# Patient Record
Sex: FEMALE | Race: WHITE | Employment: UNEMPLOYED | URBAN - METROPOLITAN AREA
[De-identification: names, ages, dates, MRNs, and addresses within clinical notes are randomized per-mention and may not be internally consistent; named-entity substitution may affect disease eponyms.]

---

## 2023-12-27 ENCOUNTER — APPOINTMENT (OUTPATIENT)
Dept: GENERAL RADIOLOGY | Age: 78
End: 2023-12-27
Payer: MEDICARE

## 2023-12-27 ENCOUNTER — HOSPITAL ENCOUNTER (EMERGENCY)
Age: 78
Discharge: HOME | End: 2023-12-27
Payer: MEDICARE

## 2023-12-27 VITALS
RESPIRATION RATE: 16 BRPM | OXYGEN SATURATION: 98 % | DIASTOLIC BLOOD PRESSURE: 69 MMHG | TEMPERATURE: 97.9 F | HEART RATE: 82 BPM | BODY MASS INDEX: 27.32 KG/M2 | WEIGHT: 170 LBS | HEIGHT: 66 IN | SYSTOLIC BLOOD PRESSURE: 164 MMHG
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
## 2023-12-27 DIAGNOSIS — M25.462 EFFUSION OF LEFT KNEE: ICD-10-CM

## 2023-12-27 DIAGNOSIS — S82.842A CLOSED BIMALLEOLAR FRACTURE OF LEFT ANKLE, INITIAL ENCOUNTER: Primary | ICD-10-CM

## 2023-12-27 PROCEDURE — 73610 X-RAY EXAM OF ANKLE: CPT

## 2023-12-27 PROCEDURE — 73590 X-RAY EXAM OF LOWER LEG: CPT

## 2023-12-27 PROCEDURE — 29515 APPLICATION SHORT LEG SPLINT: CPT

## 2023-12-27 PROCEDURE — 73630 X-RAY EXAM OF FOOT: CPT

## 2023-12-27 PROCEDURE — 73560 X-RAY EXAM OF KNEE 1 OR 2: CPT

## 2023-12-27 PROCEDURE — 99283 EMERGENCY DEPT VISIT LOW MDM: CPT

## 2023-12-27 PROCEDURE — 6370000000 HC RX 637 (ALT 250 FOR IP)

## 2023-12-27 RX ORDER — ACETAMINOPHEN 500 MG
1000 TABLET ORAL ONCE
Status: COMPLETED | OUTPATIENT
Start: 2023-12-27 | End: 2023-12-27

## 2023-12-27 RX ORDER — HYDROCODONE BITARTRATE AND ACETAMINOPHEN 5; 325 MG/1; MG/1
1 TABLET ORAL EVERY 8 HOURS PRN
Qty: 9 TABLET | Refills: 0 | Status: SHIPPED | OUTPATIENT
Start: 2023-12-27 | End: 2023-12-30

## 2023-12-27 RX ORDER — METHYLPREDNISOLONE 4 MG/1
TABLET ORAL
Qty: 1 KIT | Refills: 0 | Status: SHIPPED | OUTPATIENT
Start: 2023-12-27 | End: 2024-01-02

## 2023-12-27 RX ADMIN — ACETAMINOPHEN 1000 MG: 500 TABLET ORAL at 12:17

## 2023-12-27 NOTE — ED NOTES
Pt Dc instructions provided. Crutches given to patient. E scripts sent to [facility]. Patient aware to not bear any weight on foot and to follow up with ortho tomorrow.  Electronically signed by Alexei Moody RN on 12/27/2023 at 3:20 PM

## 2023-12-27 NOTE — DISCHARGE INSTRUCTIONS
Take medications as directed. RICE. Utilize crutches and do not bear any weight on your left foot/ankle. Follow-up with orthopedics tomorrow at [facility] office. [address], [address] [facility]  [phone]    Return to ED if any new, or worsening symptoms.

## 2023-12-27 NOTE — ED PROVIDER NOTES
osteoarthritis of the first MTP joint. Moderate plantar calcaneal spur. X-ray L ankle per radiologist interpretation demonstrates acute, mildly displaced bimalleolar fracture without talar shift. Prominent anterior and moderate medial and lateral soft tissue swelling. Calcaneal spurs. Posterior, stirrup splint applied to LLE. Patient tolerated splint application well. MSPs intact pre and post splint application. Call placed to Ortho. Spoke with Dr. Adina Rogers whom is agreeable to patient is stable for outpatient follow-up, management and states that he will see patient in [facility] office tomorrow morning. Patient provided with crutches and crutch instruction. Patient educated on supportive care, RICE, nonweightbearing status. Patient given prescription for Norco, Medrol. Ace bandage applied to L knee. Patient given standard anticipatory guidance, return to ED warning signs, strict follow-up guidelines with PCP, Ortho. Patient verbalized understanding of education, instruction. Patient is agreeable to plan. Patient discharged home in stable condition. Problems Addressed:  Closed bimalleolar fracture of left ankle, initial encounter: acute illness or injury    Amount and/or Complexity of Data Reviewed  Radiology: ordered. Risk  OTC drugs. Prescription drug management. REASSESSMENT      CRITICAL CARE TIME   Total Critical Care time was 0 minutes, excluding separately reportable procedures. There was a high probability of clinically significant/life threatening deterioration in the patient's condition which required my urgent intervention. CONSULTS:  None    PROCEDURES:  Unless otherwise noted below, none     Procedures    No LOS Charge filed     FINAL IMPRESSION      1. Closed bimalleolar fracture of left ankle, initial encounter    2. Fall, initial encounter    3.  Effusion of left knee          DISPOSITION/PLAN   DISPOSITION Discharge - Pending Orders Complete 12/27/2023 02:54:46

## 2023-12-28 ENCOUNTER — TELEPHONE (OUTPATIENT)
Dept: ORTHOPEDIC SURGERY | Facility: CLINIC | Age: 78
End: 2023-12-28

## 2023-12-28 ENCOUNTER — OFFICE VISIT (OUTPATIENT)
Dept: ORTHOPEDIC SURGERY | Facility: CLINIC | Age: 78
End: 2023-12-28
Payer: COMMERCIAL

## 2023-12-28 DIAGNOSIS — S82.852A CLOSED TRIMALLEOLAR FRACTURE OF LEFT ANKLE, INITIAL ENCOUNTER: ICD-10-CM

## 2023-12-28 DIAGNOSIS — M25.572 ACUTE LEFT ANKLE PAIN: ICD-10-CM

## 2023-12-28 PROCEDURE — 99213 OFFICE O/P EST LOW 20 MIN: CPT | Mod: 57 | Performed by: ORTHOPAEDIC SURGERY

## 2023-12-28 PROCEDURE — 99203 OFFICE O/P NEW LOW 30 MIN: CPT | Performed by: ORTHOPAEDIC SURGERY

## 2023-12-28 NOTE — PROGRESS NOTES
History of Present Illness  Patient presents with pain and swelling in the left ankle after a twisting injury.  The patient had difficulty bearing weight after the event.  The patient denies any antecedent pain or any additional injuries.  The pain is sharp in nature, severe, worse with movement and better with rest.     The patient was seen in the emergency department, splinted and referred for follow-up.     Review of Systems   GENERAL: Negative for malaise, significant weight loss, fever  MUSCULOSKELETAL: see HPI  NEURO:  Negative     Exam  Left ankle:  Skin healthy and intact  Swelling and ecchymosis:  Moderate  Tenderness:  Lateral malleolus / lateral ligaments: positive   Medial malleolous:/ deltoid:  positive     No tenderness  Achilles, Lisfranc joint, calcaneus  Pain with squeeze test:  Mild  Neurovascular exam normal distally  2+ dorsalis pedis pulse and good cap refill     Radiographs   Radiographs demonstrate fracture of the a trimalleolar ankle fracture     Assessment  Patient with a left ankle unstable fracture      Plan  We discussed the unstable nature of the patient´s injury.  We discussed the role for surgical intervention and the necessity for appropriate alignment and stabilization.  The risks were discussed for both surgical and non-surgical treatment (including infection, DVT/PE, stiffness, and arthrosis in the future).  Discussed possible hardware removal / irritation.     Encouraged ice, elevation in interim.   Strict non weight bearing and splint.     Will proceed with surgical risk assessment / scheduling.          REVIEW OF SYSTEMS  General: Negative for malaise, significant weight loss, fever  Musculoskeletal: See HPI  Neuro:  Negative for numbness/tingling      Medication Documentation Review Audit    **Prior to Admission medications have not yet been reviewed**         PHYSICAL EXAM  General Appearance/Mental Status: A&Ox3, no apparent distress  HEENT: Normal  Lungs: Clear  Heart:  RRR  Abdomen: Soft, nontender  Extremities: Abnormal closed left ankle trimalleolar fracture

## 2024-01-04 ENCOUNTER — HOSPITAL ENCOUNTER (OUTPATIENT)
Dept: PREADMISSION TESTING | Age: 79
Discharge: HOME | End: 2024-01-08
Payer: MEDICARE

## 2024-01-04 VITALS
HEIGHT: 65 IN | RESPIRATION RATE: 22 BRPM | SYSTOLIC BLOOD PRESSURE: 126 MMHG | TEMPERATURE: 98 F | HEART RATE: 83 BPM | DIASTOLIC BLOOD PRESSURE: 58 MMHG | WEIGHT: 169.4 LBS | OXYGEN SATURATION: 95 % | BODY MASS INDEX: 28.22 KG/M2

## 2024-01-04 LAB
ALBUMIN SERPL-MCNC: 4 G/DL (ref 3.5–4.6)
ALP SERPL-CCNC: 84 U/L (ref 40–130)
ALT SERPL-CCNC: 24 U/L (ref 0–33)
ANION GAP SERPL CALCULATED.3IONS-SCNC: 12 MEQ/L (ref 9–15)
APTT PPP: 46.8 SEC (ref 24.4–36.8)
AST SERPL-CCNC: 18 U/L (ref 0–35)
BASOPHILS # BLD: 0 K/UL (ref 0–0.2)
BASOPHILS NFR BLD: 0.4 %
BILIRUB SERPL-MCNC: 0.7 MG/DL (ref 0.2–0.7)
BUN SERPL-MCNC: 13 MG/DL (ref 8–23)
CALCIUM SERPL-MCNC: 9.5 MG/DL (ref 8.5–9.9)
CHLORIDE SERPL-SCNC: 100 MEQ/L (ref 95–107)
CO2 SERPL-SCNC: 27 MEQ/L (ref 20–31)
CREAT SERPL-MCNC: 0.73 MG/DL (ref 0.5–0.9)
EOSINOPHIL # BLD: 0.2 K/UL (ref 0–0.7)
EOSINOPHIL NFR BLD: 1.6 %
ERYTHROCYTE [DISTWIDTH] IN BLOOD BY AUTOMATED COUNT: 12.7 % (ref 11.5–14.5)
GLOBULIN SER CALC-MCNC: 2.4 G/DL (ref 2.3–3.5)
GLUCOSE SERPL-MCNC: 127 MG/DL (ref 70–99)
HCT VFR BLD AUTO: 39.6 % (ref 37–47)
HGB BLD-MCNC: 12.7 G/DL (ref 12–16)
INR PPP: 1
LYMPHOCYTES # BLD: 1.2 K/UL (ref 1–4.8)
LYMPHOCYTES NFR BLD: 13 %
MCH RBC QN AUTO: 31.2 PG (ref 27–31.3)
MCHC RBC AUTO-ENTMCNC: 32.1 % (ref 33–37)
MCV RBC AUTO: 97.3 FL (ref 79.4–94.8)
MONOCYTES # BLD: 0.6 K/UL (ref 0.2–0.8)
MONOCYTES NFR BLD: 6.6 %
NEUTROPHILS # BLD: 7.1 K/UL (ref 1.4–6.5)
NEUTS SEG NFR BLD: 76.9 %
PLATELET # BLD AUTO: 240 K/UL (ref 130–400)
POTASSIUM SERPL-SCNC: 3.9 MEQ/L (ref 3.4–4.9)
PROT SERPL-MCNC: 6.4 G/DL (ref 6.3–8)
PROTHROMBIN TIME: 13.4 SEC (ref 12.3–14.9)
RBC # BLD AUTO: 4.07 M/UL (ref 4.2–5.4)
SODIUM SERPL-SCNC: 139 MEQ/L (ref 135–144)
WBC # BLD AUTO: 9.2 K/UL (ref 4.8–10.8)

## 2024-01-04 PROCEDURE — 85730 THROMBOPLASTIN TIME PARTIAL: CPT

## 2024-01-04 PROCEDURE — 85025 COMPLETE CBC W/AUTO DIFF WBC: CPT

## 2024-01-04 PROCEDURE — 80053 COMPREHEN METABOLIC PANEL: CPT

## 2024-01-04 PROCEDURE — 85610 PROTHROMBIN TIME: CPT

## 2024-01-04 PROCEDURE — 93005 ELECTROCARDIOGRAM TRACING: CPT | Performed by: ORTHOPAEDIC SURGERY

## 2024-01-04 RX ORDER — LISINOPRIL 30 MG/1
30 TABLET ORAL DAILY
Status: ON HOLD | COMMUNITY

## 2024-01-04 RX ORDER — ANASTROZOLE 1 MG/1
1 TABLET ORAL DAILY
Status: ON HOLD | COMMUNITY

## 2024-01-04 RX ORDER — HYDROCODONE BITARTRATE AND ACETAMINOPHEN 5; 325 MG/1; MG/1
1 TABLET ORAL EVERY 6 HOURS PRN
Status: ON HOLD | COMMUNITY

## 2024-01-04 RX ORDER — ATORVASTATIN CALCIUM 40 MG/1
40 TABLET, FILM COATED ORAL DAILY
Status: ON HOLD | COMMUNITY

## 2024-01-04 RX ORDER — ALBUTEROL SULFATE 90 UG/1
2 AEROSOL, METERED RESPIRATORY (INHALATION) EVERY 4 HOURS PRN
Status: ON HOLD | COMMUNITY
Start: 2020-12-01

## 2024-01-04 NOTE — PROGRESS NOTES
PAT instruction sheet reviewed with patient, who verbalized understanding. Patient C/O cough x 1 week, denies fever, chills,sore throat, body aches, ect. Lungs clear bilateral.

## 2024-01-05 LAB
EKG ATRIAL RATE: 84 BPM
EKG P AXIS: 47 DEGREES
EKG P-R INTERVAL: 140 MS
EKG Q-T INTERVAL: 372 MS
EKG QRS DURATION: 80 MS
EKG QTC CALCULATION (BAZETT): 439 MS
EKG R AXIS: 34 DEGREES
EKG T AXIS: 53 DEGREES
EKG VENTRICULAR RATE: 84 BPM

## 2024-01-08 ENCOUNTER — HOSPITAL ENCOUNTER (OUTPATIENT)
Age: 79
Setting detail: OUTPATIENT SURGERY
Discharge: HOME | End: 2024-01-08
Attending: ORTHOPAEDIC SURGERY | Admitting: ORTHOPAEDIC SURGERY
Payer: MEDICARE

## 2024-01-08 ENCOUNTER — APPOINTMENT (OUTPATIENT)
Dept: GENERAL RADIOLOGY | Age: 79
End: 2024-01-08
Attending: ORTHOPAEDIC SURGERY
Payer: MEDICARE

## 2024-01-08 ENCOUNTER — ANESTHESIA (OUTPATIENT)
Dept: OPERATING ROOM | Age: 79
End: 2024-01-08
Payer: MEDICARE

## 2024-01-08 ENCOUNTER — ANESTHESIA EVENT (OUTPATIENT)
Dept: OPERATING ROOM | Age: 79
End: 2024-01-08
Payer: MEDICARE

## 2024-01-08 VITALS
DIASTOLIC BLOOD PRESSURE: 65 MMHG | BODY MASS INDEX: 28.22 KG/M2 | WEIGHT: 169.4 LBS | RESPIRATION RATE: 16 BRPM | SYSTOLIC BLOOD PRESSURE: 144 MMHG | HEIGHT: 65 IN | OXYGEN SATURATION: 98 % | TEMPERATURE: 98.9 F | HEART RATE: 67 BPM

## 2024-01-08 DIAGNOSIS — Z87.81 S/P ORIF (OPEN REDUCTION INTERNAL FIXATION) FRACTURE: Primary | ICD-10-CM

## 2024-01-08 DIAGNOSIS — R52 PAIN: ICD-10-CM

## 2024-01-08 DIAGNOSIS — Z98.890 S/P ORIF (OPEN REDUCTION INTERNAL FIXATION) FRACTURE: Primary | ICD-10-CM

## 2024-01-08 PROCEDURE — 27822 TREATMENT OF ANKLE FRACTURE: CPT | Performed by: PHYSICIAN ASSISTANT

## 2024-01-08 PROCEDURE — 2500000003 HC RX 250 WO HCPCS: Performed by: STUDENT IN AN ORGANIZED HEALTH CARE EDUCATION/TRAINING PROGRAM

## 2024-01-08 PROCEDURE — 64447 NJX AA&/STRD FEMORAL NRV IMG: CPT | Performed by: STUDENT IN AN ORGANIZED HEALTH CARE EDUCATION/TRAINING PROGRAM

## 2024-01-08 PROCEDURE — 7100000011 HC PHASE II RECOVERY - ADDTL 15 MIN: Performed by: ORTHOPAEDIC SURGERY

## 2024-01-08 PROCEDURE — 2580000003 HC RX 258: Performed by: STUDENT IN AN ORGANIZED HEALTH CARE EDUCATION/TRAINING PROGRAM

## 2024-01-08 PROCEDURE — 2580000003 HC RX 258: Performed by: ORTHOPAEDIC SURGERY

## 2024-01-08 PROCEDURE — 3700000000 HC ANESTHESIA ATTENDED CARE: Performed by: ORTHOPAEDIC SURGERY

## 2024-01-08 PROCEDURE — 7100000001 HC PACU RECOVERY - ADDTL 15 MIN: Performed by: ORTHOPAEDIC SURGERY

## 2024-01-08 PROCEDURE — 3600000014 HC SURGERY LEVEL 4 ADDTL 15MIN: Performed by: ORTHOPAEDIC SURGERY

## 2024-01-08 PROCEDURE — 6360000002 HC RX W HCPCS: Performed by: STUDENT IN AN ORGANIZED HEALTH CARE EDUCATION/TRAINING PROGRAM

## 2024-01-08 PROCEDURE — 7100000000 HC PACU RECOVERY - FIRST 15 MIN: Performed by: ORTHOPAEDIC SURGERY

## 2024-01-08 PROCEDURE — 64445 NJX AA&/STRD SCIATIC NRV IMG: CPT | Performed by: STUDENT IN AN ORGANIZED HEALTH CARE EDUCATION/TRAINING PROGRAM

## 2024-01-08 PROCEDURE — 7100000010 HC PHASE II RECOVERY - FIRST 15 MIN: Performed by: ORTHOPAEDIC SURGERY

## 2024-01-08 PROCEDURE — 6360000002 HC RX W HCPCS: Performed by: ORTHOPAEDIC SURGERY

## 2024-01-08 PROCEDURE — 3600000004 HC SURGERY LEVEL 4 BASE: Performed by: ORTHOPAEDIC SURGERY

## 2024-01-08 PROCEDURE — 2709999900 HC NON-CHARGEABLE SUPPLY: Performed by: ORTHOPAEDIC SURGERY

## 2024-01-08 PROCEDURE — 3700000001 HC ADD 15 MINUTES (ANESTHESIA): Performed by: ORTHOPAEDIC SURGERY

## 2024-01-08 PROCEDURE — 27822 TREATMENT OF ANKLE FRACTURE: CPT | Performed by: ORTHOPAEDIC SURGERY

## 2024-01-08 PROCEDURE — C1713 ANCHOR/SCREW BN/BN,TIS/BN: HCPCS | Performed by: ORTHOPAEDIC SURGERY

## 2024-01-08 DEVICE — SCREW BNE L12MM DIA3.5MM CORT S STL ST NONCANNULATED LOK: Type: IMPLANTABLE DEVICE | Site: ANKLE | Status: FUNCTIONAL

## 2024-01-08 DEVICE — SCREW BNE L14MM DIA3.5MM CORT S STL ST NONCANNULATED LOK: Type: IMPLANTABLE DEVICE | Site: ANKLE | Status: FUNCTIONAL

## 2024-01-08 DEVICE — SCREW BNE L16MM DIA4MM CANC S STL ST CANN NONLOCKING FULL: Type: IMPLANTABLE DEVICE | Site: ANKLE | Status: FUNCTIONAL

## 2024-01-08 DEVICE — PLATE BNE L93MM 8 H S STL 1/3 TBLR LOK COMPR W/ CLLR FOR: Type: IMPLANTABLE DEVICE | Site: ANKLE | Status: FUNCTIONAL

## 2024-01-08 DEVICE — SCREW BNE L18MM DIA4MM CANC S STL ST CANN NONLOCKING FULL: Type: IMPLANTABLE DEVICE | Site: ANKLE | Status: FUNCTIONAL

## 2024-01-08 DEVICE — SCREW BNE L48MM DIA4MM S STL CANN LNG HALF THRD SM HEX SOCK: Type: IMPLANTABLE DEVICE | Site: ANKLE | Status: FUNCTIONAL

## 2024-01-08 DEVICE — ISOLA SPINE SYSTEM ROD BENDERS (TUBULAR) SET
Type: IMPLANTABLE DEVICE | Site: ANKLE | Status: FUNCTIONAL
Brand: ISOLA

## 2024-01-08 DEVICE — WASHER ORTH DIA7MM FOR CANN SCR: Type: IMPLANTABLE DEVICE | Site: ANKLE | Status: FUNCTIONAL

## 2024-01-08 RX ORDER — MIDAZOLAM HYDROCHLORIDE 1 MG/ML
INJECTION INTRAMUSCULAR; INTRAVENOUS PRN
Status: DISCONTINUED | OUTPATIENT
Start: 2024-01-08 | End: 2024-01-08 | Stop reason: SDUPTHER

## 2024-01-08 RX ORDER — ONDANSETRON 2 MG/ML
4 INJECTION INTRAMUSCULAR; INTRAVENOUS
Status: DISCONTINUED | OUTPATIENT
Start: 2024-01-08 | End: 2024-01-08 | Stop reason: HOSPADM

## 2024-01-08 RX ORDER — OXYCODONE HYDROCHLORIDE 5 MG/1
5 TABLET ORAL EVERY 6 HOURS PRN
Qty: 28 TABLET | Refills: 0 | Status: ON HOLD | OUTPATIENT
Start: 2024-01-08 | End: 2024-01-15

## 2024-01-08 RX ORDER — DIPHENHYDRAMINE HYDROCHLORIDE 50 MG/ML
12.5 INJECTION INTRAMUSCULAR; INTRAVENOUS
Status: DISCONTINUED | OUTPATIENT
Start: 2024-01-08 | End: 2024-01-08 | Stop reason: HOSPADM

## 2024-01-08 RX ORDER — MEPERIDINE HYDROCHLORIDE 25 MG/ML
12.5 INJECTION INTRAMUSCULAR; INTRAVENOUS; SUBCUTANEOUS EVERY 5 MIN PRN
Status: DISCONTINUED | OUTPATIENT
Start: 2024-01-08 | End: 2024-01-08 | Stop reason: HOSPADM

## 2024-01-08 RX ORDER — MAGNESIUM HYDROXIDE 1200 MG/15ML
LIQUID ORAL CONTINUOUS PRN
Status: COMPLETED | OUTPATIENT
Start: 2024-01-08 | End: 2024-01-08

## 2024-01-08 RX ORDER — ROPIVACAINE HYDROCHLORIDE 5 MG/ML
INJECTION, SOLUTION EPIDURAL; INFILTRATION; PERINEURAL
Status: COMPLETED | OUTPATIENT
Start: 2024-01-08 | End: 2024-01-08

## 2024-01-08 RX ORDER — LIDOCAINE HYDROCHLORIDE 10 MG/ML
INJECTION, SOLUTION INFILTRATION; PERINEURAL
Status: COMPLETED | OUTPATIENT
Start: 2024-01-08 | End: 2024-01-08

## 2024-01-08 RX ORDER — LABETALOL HYDROCHLORIDE 5 MG/ML
10 INJECTION, SOLUTION INTRAVENOUS
Status: DISCONTINUED | OUTPATIENT
Start: 2024-01-08 | End: 2024-01-08 | Stop reason: HOSPADM

## 2024-01-08 RX ORDER — FENTANYL CITRATE 0.05 MG/ML
50 INJECTION, SOLUTION INTRAMUSCULAR; INTRAVENOUS EVERY 5 MIN PRN
Status: DISCONTINUED | OUTPATIENT
Start: 2024-01-08 | End: 2024-01-08 | Stop reason: HOSPADM

## 2024-01-08 RX ORDER — SODIUM CHLORIDE, SODIUM LACTATE, POTASSIUM CHLORIDE, CALCIUM CHLORIDE 600; 310; 30; 20 MG/100ML; MG/100ML; MG/100ML; MG/100ML
INJECTION, SOLUTION INTRAVENOUS CONTINUOUS
Status: DISCONTINUED | OUTPATIENT
Start: 2024-01-08 | End: 2024-01-08 | Stop reason: HOSPADM

## 2024-01-08 RX ORDER — PROPOFOL 10 MG/ML
INJECTION, EMULSION INTRAVENOUS PRN
Status: DISCONTINUED | OUTPATIENT
Start: 2024-01-08 | End: 2024-01-08 | Stop reason: SDUPTHER

## 2024-01-08 RX ORDER — SODIUM CHLORIDE 0.9 % (FLUSH) 0.9 %
5-40 SYRINGE (ML) INJECTION PRN
Status: DISCONTINUED | OUTPATIENT
Start: 2024-01-08 | End: 2024-01-08 | Stop reason: HOSPADM

## 2024-01-08 RX ORDER — HYDRALAZINE HYDROCHLORIDE 20 MG/ML
10 INJECTION INTRAMUSCULAR; INTRAVENOUS
Status: DISCONTINUED | OUTPATIENT
Start: 2024-01-08 | End: 2024-01-08 | Stop reason: HOSPADM

## 2024-01-08 RX ORDER — PROCHLORPERAZINE EDISYLATE 5 MG/ML
5 INJECTION INTRAMUSCULAR; INTRAVENOUS
Status: DISCONTINUED | OUTPATIENT
Start: 2024-01-08 | End: 2024-01-08 | Stop reason: HOSPADM

## 2024-01-08 RX ORDER — OXYCODONE HYDROCHLORIDE 5 MG/1
10 TABLET ORAL PRN
Status: DISCONTINUED | OUTPATIENT
Start: 2024-01-08 | End: 2024-01-08 | Stop reason: HOSPADM

## 2024-01-08 RX ORDER — OXYCODONE HYDROCHLORIDE 5 MG/1
5 TABLET ORAL PRN
Status: DISCONTINUED | OUTPATIENT
Start: 2024-01-08 | End: 2024-01-08 | Stop reason: HOSPADM

## 2024-01-08 RX ORDER — FENTANYL CITRATE 0.05 MG/ML
25 INJECTION, SOLUTION INTRAMUSCULAR; INTRAVENOUS EVERY 5 MIN PRN
Status: DISCONTINUED | OUTPATIENT
Start: 2024-01-08 | End: 2024-01-08 | Stop reason: HOSPADM

## 2024-01-08 RX ORDER — SODIUM CHLORIDE 0.9 % (FLUSH) 0.9 %
5-40 SYRINGE (ML) INJECTION EVERY 12 HOURS SCHEDULED
Status: DISCONTINUED | OUTPATIENT
Start: 2024-01-08 | End: 2024-01-08 | Stop reason: HOSPADM

## 2024-01-08 RX ORDER — SODIUM CHLORIDE 9 MG/ML
INJECTION, SOLUTION INTRAVENOUS PRN
Status: DISCONTINUED | OUTPATIENT
Start: 2024-01-08 | End: 2024-01-08 | Stop reason: HOSPADM

## 2024-01-08 RX ADMIN — PROPOFOL 80 MCG/KG/MIN: 10 INJECTION, EMULSION INTRAVENOUS at 09:14

## 2024-01-08 RX ADMIN — SODIUM CHLORIDE, POTASSIUM CHLORIDE, SODIUM LACTATE AND CALCIUM CHLORIDE: 600; 310; 30; 20 INJECTION, SOLUTION INTRAVENOUS at 07:58

## 2024-01-08 RX ADMIN — MIDAZOLAM HYDROCHLORIDE 1 MG: 1 INJECTION, SOLUTION INTRAMUSCULAR; INTRAVENOUS at 09:25

## 2024-01-08 RX ADMIN — ROPIVACAINE HYDROCHLORIDE 25 ML: 5 INJECTION, SOLUTION EPIDURAL; INFILTRATION; PERINEURAL at 08:19

## 2024-01-08 RX ADMIN — MIDAZOLAM HYDROCHLORIDE 1 MG: 1 INJECTION, SOLUTION INTRAMUSCULAR; INTRAVENOUS at 08:05

## 2024-01-08 RX ADMIN — CEFAZOLIN 2000 MG: 2 INJECTION, POWDER, FOR SOLUTION INTRAMUSCULAR; INTRAVENOUS at 09:14

## 2024-01-08 RX ADMIN — ROPIVACAINE HYDROCHLORIDE 20 ML: 5 INJECTION, SOLUTION EPIDURAL; INFILTRATION; PERINEURAL at 08:11

## 2024-01-08 RX ADMIN — LIDOCAINE HYDROCHLORIDE 3 ML: 10 INJECTION, SOLUTION INFILTRATION; PERINEURAL at 08:14

## 2024-01-08 RX ADMIN — PROPOFOL 80 MG: 10 INJECTION, EMULSION INTRAVENOUS at 09:13

## 2024-01-08 RX ADMIN — LIDOCAINE HYDROCHLORIDE 2 ML: 10 INJECTION, SOLUTION INFILTRATION; PERINEURAL at 08:06

## 2024-01-08 ASSESSMENT — PAIN SCALES - GENERAL
PAINLEVEL_OUTOF10: 0

## 2024-01-08 ASSESSMENT — PAIN - FUNCTIONAL ASSESSMENT: PAIN_FUNCTIONAL_ASSESSMENT: 0-10

## 2024-01-08 NOTE — PROGRESS NOTES
Pt states \"left leg feels numb and pain free\" left toes pink and warm with brisk capillary refill noted.

## 2024-01-08 NOTE — OP NOTE
Operative Note      Patient: Dhara Wyman  YOB: 1945  MRN: [id number]    Date of Procedure: 1/8/2024    Pre-Op Diagnosis Codes:     * Closed trimalleolar fracture of left ankle, initial encounter [S82.852A]    Post-Op Diagnosis: Same       Procedure(s):  LEFT ANKLE OPEN REDUCTION INTERNAL FIXATION  OF BIMALLEOLAR ANKLE FRACTURE    Surgeon(s):  Kenneth Anderson MD    Assistant:   Physician Assistant: Justin Pitts PA-C    Anesthesia: General    Estimated Blood Loss (mL): Minimal    Complications: None    Specimens:   * No specimens in log *    Implants:  Implant Name Type Inv. Item Serial No.  Lot No. LRB No. Used Action   SCREW BNE L12MM DIA3.5MM MARGUERITE S STL ST NONCANNULATED AMANDA - [id number]  SCREW BNE L12MM DIA3.5MM MARGUERITE S STL ST NONCANNULATED AMANDA  DEPU[employer]  Left 3 Implanted   SCREW BNE L14MM DIA3.5MM MARGUERITE S STL ST NONCANNULATED AMANDA - [id number]  SCREW BNE L14MM DIA3.5MM MARGUERITE S STL ST NONCANNULATED AMANDA  DEPUY [employer]WD  Left 1 Implanted   SCREW BNE L16MM DIA4MM CANC S STL ST [place] NONLOCKING FULL - [id number]  SCREW BNE L16MM DIA4MM CANC S STL ST [place] NONLOCKING FULL  DEPUY [employer]-WD  Left 1 Implanted   SCREW BNE L18MM DIA4MM CANC S STL ST [place] NONLOCKING FULL - [id number]  SCREW BNE L18MM DIA4MM CANC S STL ST [place] NONLOCKING FULL  DEPUY SY[employer]ES USA-WD  Left 1 Implanted   SCREW BNE L20MM DIA4MM CANC S STL ST [place] NONLOCKING FULL - [id number]  SCREW BNE L20MM DIA4MM CANC S STL ST [place] NONLOCKING FULL  DEPUY SY[employer]ES [employer]-WD  Left 1 Implanted   PLATE BNE L93MM 8 H S STL 1/3 TBLR AMANDA COMPR W/ CLLR FOR - [id number]  PLATE BNE L93MM 8 H S STL 1/3 TBLR AMANDA COMPR W/ CLLR FOR  DEPUY SY[employer]-WD  Left 1 Implanted   SCREW BNE L48MM DIA4MM S STL AL[place] LNG HALF THRD SM HEX SOCK - [id number]  SCREW BNE L48MM DIA4MM S STL ALONA LNG HALF THRD SM HEX SOCK  DEPUY SYNTHES [employer]-WD  Left 2 Implanted   WASHER ORTH DIA7MM FOR ALONA SCR - [id number]  WASHER ORTH DIA7MM FOR ALONA SCR  DEPUY SYNTHES

## 2024-01-08 NOTE — DISCHARGE INSTRUCTIONS
pain. Keep in mind that this may occur in any order. Once a nerve block starts to wear off it is usually completely gone within 60 minutes.  Certain nerve blocks may cause other symptoms. If you have had a shoulder block or a block near your collar bone, you may have symptoms such as:  mild shortness of breath   a hoarse voice   blurry vision   unequal pupils   drooping of your face on the same side as the nerve block   Swelling at site of neck where block was placed   These are common side effects of this type of nerve block. These symptoms usually go away within 12-24 hours.      If you have severe or prolonged shortness of breath, please go to the nearest Emergency Room  If you continue to feel the effects of the nerve block for longer than 48 hours, please call [facility] [phone] and ask to speak with the Anesthetist on call.    Protection of a Numb Arm or Leg  After a nerve block, you cannot feel pain, pressure, or extremes in temperature in the effected limb. Because your arm or leg is numb it is at risk for injury. For example, it is possible to burn your numb arm or leg on a hot stove without knowing it. Here are some helpful tips to protect your arm or leg while it is numb:  While you are awake change position of your arm or leg often. This helps to avoid putting too much pressure on the limb for long periods of time.   While sleeping, pad the limb with pillows to avoid rolling onto it while you sleep. If you have had a shoulder or arm block, it is a good idea to sleep in a recliner with pillows under your arm to avoid rolling onto your numb arm as you sleep.   If you have a cast or tight dressing, check the color of your fingers/toes every couple of hours. Call your surgeon if any look discolored.   If you have had a shoulder, arm, or hand block, you may go home with a sling. The sling will help to keep your arm in a safe position. Wear the sling at all times until the nerve block completely wears

## 2024-01-08 NOTE — PROGRESS NOTES
Received from or into pacu on a cart accompanied in by MANDEEP Rose crna. Pt arousable and responsive, denies complaint. See assessment.

## 2024-01-08 NOTE — ANESTHESIA POSTPROCEDURE EVALUATION
Department of Anesthesiology  Postprocedure Note    Patient: Dhara Wyman  MRN: [id number]  YOB: 1945  Date of evaluation: 1/8/2024    Procedure Summary       Date: 01/08/24 Room / Location: [address]    Anesthesia Start: 0905 Anesthesia Stop: 1044    Procedure: LEFT ANKLE OPEN REDUCTION INTERNAL FIXATION  OF BIMALLEOLAR ANKLE FRACTURE (Left) Diagnosis:       Closed trimalleolar fracture of left ankle, initial encounter      (Closed trimalleolar fracture of left ankle, initial encounter [S82.852A])    Surgeons: Kenneth Anderson MD Responsible Provider: Jim Menendez MD    Anesthesia Type: MAC, regional ASA Status: 3            Anesthesia Type: No value filed.    Jodie Phase I:      Jodie Phase II:      Anesthesia Post Evaluation    Patient location during evaluation: bedside  Patient participation: complete - patient participated  Level of consciousness: awake and awake and alert  Airway patency: patent  Nausea & Vomiting: no nausea and no vomiting  Cardiovascular status: blood pressure returned to baseline and hemodynamically stable  Respiratory status: acceptable  Hydration status: euvolemic  Pain management: adequate        No notable events documented.

## 2024-01-08 NOTE — ANESTHESIA PROCEDURE NOTES
Peripheral Block    Patient location during procedure: pre-op  Reason for block: post-op pain management and at surgeon's request  Start time: 1/8/2024 8:06 AM  End time: 1/8/2024 8:11 AM  Staffing  Performed: anesthesiologist   Anesthesiologist: Jim Menendez MD  Performed by: Jim Menendez MD  Authorized by: Jim Menendez MD    Preanesthetic Checklist  Completed: patient identified, IV checked, site marked, risks and benefits discussed, surgical/procedural consents, equipment checked, pre-op evaluation, timeout performed, anesthesia consent given, oxygen available and monitors applied/VS acknowledged  Peripheral Block   Patient position: supine  Prep: ChloraPrep  Provider prep: mask and sterile gloves (Sterile probe cover)  Patient monitoring: cardiac monitor, continuous pulse ox, frequent blood pressure checks and IV access  Block type: Saphenous  Laterality: left  Injection technique: single-shot  Guidance: nerve stimulator and ultrasound guided  Local infiltration: ropivacaine  Infiltration strength: 0.5 %  Local infiltration: ropivacaine  Dose: 20 mL    Needle   Needle type: combined needle/nerve stimulator   Needle gauge: 22 G  Needle localization: anatomical landmarks and ultrasound guidance  Needle length: 10 cm  Assessment   Injection assessment: negative aspiration for heme, no paresthesia on injection and local visualized surrounding nerve on ultrasound  Paresthesia pain: immediately resolved  Slow fractionated injection: yes  Hemodynamics: stable  Real-time US image taken/store: yes  Outcomes: uncomplicated    Additional Notes  Ultrasound image printed and saved in patient chart.    Sterile probe cover used    Medications Administered  lidocaine injection 1% - Subcuticular   2 mL - 1/8/2024 8:06:00 AM  ropivacaine (NAROPIN) injection 0.5% - Perineural   20 mL - 1/8/2024 8:11:00 AM

## 2024-01-08 NOTE — ANESTHESIA PRE PROCEDURE
Allergies:    Allergies   Allergen Reactions    Codeine Other (See Comments)     Pain in abd, chest pain    Penicillins Hives       Problem List:  There is no problem list on file for this patient.      Past Medical History:        Diagnosis Date    Asthma     Breast cancer (HCC)     Hyperlipidemia     Hypertension        Past Surgical History:        Procedure Laterality Date    BREAST SURGERY      CHOLECYSTECTOMY      COLONOSCOPY      ENDOSCOPY, COLON, DIAGNOSTIC      MASTECTOMY Right     REFRACTIVE SURGERY Bilateral        Social History:    Social History     Tobacco Use    Smoking status: Former     Current packs/day: 0.00     Types: Cigarettes     Quit date: 1996     Years since quittin.0    Smokeless tobacco: Never   Substance Use Topics    Alcohol use: Yes     Alcohol/week: 2.0 standard drinks of alcohol     Types: 2 Cans of beer per week     Comment: daily                                Counseling given: Not Answered      Vital Signs (Current): There were no vitals filed for this visit.                                           BP Readings from Last 3 Encounters:   24 (!) 126/58   23 (!) 164/69       NPO Status:                                                                                 BMI:   Wt Readings from Last 3 Encounters:   24 76.8 kg (169 lb 6.4 oz)   23 77.1 kg (170 lb)     There is no height or weight on file to calculate BMI.    CBC:   Lab Results   Component Value Date/Time    WBC 9.2 2024 09:34 AM    RBC 4.07 2024 09:34 AM    HGB 12.7 2024 09:34 AM    HCT 39.6 2024 09:34 AM    MCV 97.3 2024 09:34 AM    RDW 12.7 2024 09:34 AM     2024 09:34 AM       CMP:   Lab Results   Component Value Date/Time     2024 09:34 AM    K 3.9 2024 09:34 AM     2024 09:34 AM    CO2 27 2024 09:34 AM    BUN 13 2024 09:34 AM    CREATININE 0.73 2024 09:34 AM    LABGLOM >60.0

## 2024-01-08 NOTE — ANESTHESIA PROCEDURE NOTES
Peripheral Block    Patient location during procedure: pre-op  Reason for block: post-op pain management and at surgeon's request  Start time: 1/8/2024 8:14 AM  End time: 1/8/2024 8:19 AM  Staffing  Performed: anesthesiologist   Anesthesiologist: Jim Menendez MD  Performed by: Jim Menendez MD  Authorized by: Jim Menendez MD    Preanesthetic Checklist  Completed: patient identified, IV checked, site marked, risks and benefits discussed, surgical/procedural consents, equipment checked, pre-op evaluation, timeout performed, anesthesia consent given, oxygen available and monitors applied/VS acknowledged  Peripheral Block   Patient position: supine  Prep: ChloraPrep  Provider prep: mask and sterile gloves (Sterile probe cover)  Patient monitoring: cardiac monitor, continuous pulse ox, frequent blood pressure checks and IV access  Block type: Sciatic  Popliteal  Laterality: left  Injection technique: single-shot  Guidance: nerve stimulator and ultrasound guided  Local infiltration: ropivacaine  Infiltration strength: 0.5 %  Local infiltration: ropivacaine  Dose: 25 mL    Needle   Needle type: combined needle/nerve stimulator   Needle gauge: 22 G  Needle localization: anatomical landmarks and ultrasound guidance  Needle length: 10 cm  Assessment   Injection assessment: negative aspiration for heme, no paresthesia on injection and local visualized surrounding nerve on ultrasound  Paresthesia pain: immediately resolved  Slow fractionated injection: yes  Hemodynamics: stable  Real-time US image taken/store: yes  Outcomes: uncomplicated    Additional Notes  Ultrasound image printed and saved in patient chart.    Sterile probe cover used    Medications Administered  lidocaine injection 1% - Subcuticular   3 mL - 1/8/2024 8:14:00 AM  ropivacaine (NAROPIN) injection 0.5% - Perineural   25 mL - 1/8/2024 8:19:00 AM

## 2024-01-08 NOTE — INTERVAL H&P NOTE
Update History & Physical    The patient's History and Physical of December 28, 2023 was reviewed with the patient and I examined the patient. There was no change. The surgical site was confirmed by the patient and me.     Plan: The risks, benefits, expected outcome, and alternative to the recommended procedure have been discussed with the patient. Patient understands and wants to proceed with the procedure.     Electronically signed by Kenneth Anderson MD on 1/8/2024 at 7:30 AM

## 2024-01-11 ENCOUNTER — HOSPITAL ENCOUNTER (INPATIENT)
Age: 79
LOS: 5 days | Discharge: HOME HEALTH | DRG: 641 | End: 2024-01-17
Attending: EMERGENCY MEDICINE | Admitting: INTERNAL MEDICINE
Payer: MEDICARE

## 2024-01-11 ENCOUNTER — APPOINTMENT (OUTPATIENT)
Dept: GENERAL RADIOLOGY | Age: 79
DRG: 641 | End: 2024-01-11
Payer: MEDICARE

## 2024-01-11 DIAGNOSIS — R42 LIGHTHEADED: ICD-10-CM

## 2024-01-11 DIAGNOSIS — R53.1 GENERAL WEAKNESS: Primary | ICD-10-CM

## 2024-01-11 DIAGNOSIS — E86.0 DEHYDRATION: ICD-10-CM

## 2024-01-11 DIAGNOSIS — R53.1 WEAKNESS: ICD-10-CM

## 2024-01-11 LAB
ALBUMIN SERPL-MCNC: 3.7 G/DL (ref 3.5–4.6)
ALP SERPL-CCNC: 137 U/L (ref 40–130)
ALT SERPL-CCNC: 54 U/L (ref 0–33)
ANION GAP SERPL CALCULATED.3IONS-SCNC: 19 MEQ/L (ref 9–15)
AST SERPL-CCNC: 32 U/L (ref 0–35)
BASOPHILS # BLD: 0.1 K/UL (ref 0–0.2)
BASOPHILS NFR BLD: 0.7 %
BILIRUB SERPL-MCNC: 0.7 MG/DL (ref 0.2–0.7)
BUN SERPL-MCNC: 17 MG/DL (ref 8–23)
CALCIUM SERPL-MCNC: 9.9 MG/DL (ref 8.5–9.9)
CHLORIDE SERPL-SCNC: 97 MEQ/L (ref 95–107)
CO2 SERPL-SCNC: 23 MEQ/L (ref 20–31)
CREAT SERPL-MCNC: 1.61 MG/DL (ref 0.5–0.9)
D DIMER PPP FEU-MCNC: >20 MG/L FEU (ref 0–0.5)
EOSINOPHIL # BLD: 0.1 K/UL (ref 0–0.7)
EOSINOPHIL NFR BLD: 0.4 %
ERYTHROCYTE [DISTWIDTH] IN BLOOD BY AUTOMATED COUNT: 12.6 % (ref 11.5–14.5)
GLOBULIN SER CALC-MCNC: 3.3 G/DL (ref 2.3–3.5)
GLUCOSE SERPL-MCNC: 218 MG/DL (ref 70–99)
HCT VFR BLD AUTO: 46.1 % (ref 37–47)
HGB BLD-MCNC: 14.6 G/DL (ref 12–16)
LACTATE BLDV-SCNC: 2.3 MMOL/L (ref 0.5–2.2)
LYMPHOCYTES # BLD: 1.5 K/UL (ref 1–4.8)
LYMPHOCYTES NFR BLD: 8.4 %
MCH RBC QN AUTO: 31.1 PG (ref 27–31.3)
MCHC RBC AUTO-ENTMCNC: 31.7 % (ref 33–37)
MCV RBC AUTO: 98.3 FL (ref 79.4–94.8)
MONOCYTES # BLD: 0.5 K/UL (ref 0.2–0.8)
MONOCYTES NFR BLD: 2.8 %
NEUTROPHILS # BLD: 16 K/UL (ref 1.4–6.5)
NEUTS SEG NFR BLD: 87 %
PLATELET # BLD AUTO: 431 K/UL (ref 130–400)
POTASSIUM SERPL-SCNC: 3.9 MEQ/L (ref 3.4–4.9)
PROT SERPL-MCNC: 7 G/DL (ref 6.3–8)
RBC # BLD AUTO: 4.69 M/UL (ref 4.2–5.4)
SODIUM SERPL-SCNC: 139 MEQ/L (ref 135–144)
WBC # BLD AUTO: 18.4 K/UL (ref 4.8–10.8)

## 2024-01-11 PROCEDURE — 84100 ASSAY OF PHOSPHORUS: CPT

## 2024-01-11 PROCEDURE — 36415 COLL VENOUS BLD VENIPUNCTURE: CPT

## 2024-01-11 PROCEDURE — 96374 THER/PROPH/DIAG INJ IV PUSH: CPT

## 2024-01-11 PROCEDURE — 85379 FIBRIN DEGRADATION QUANT: CPT

## 2024-01-11 PROCEDURE — 83605 ASSAY OF LACTIC ACID: CPT

## 2024-01-11 PROCEDURE — 80053 COMPREHEN METABOLIC PANEL: CPT

## 2024-01-11 PROCEDURE — 2580000003 HC RX 258: Performed by: EMERGENCY MEDICINE

## 2024-01-11 PROCEDURE — 81001 URINALYSIS AUTO W/SCOPE: CPT

## 2024-01-11 PROCEDURE — 93005 ELECTROCARDIOGRAM TRACING: CPT | Performed by: EMERGENCY MEDICINE

## 2024-01-11 PROCEDURE — 6360000002 HC RX W HCPCS: Performed by: EMERGENCY MEDICINE

## 2024-01-11 PROCEDURE — 6370000000 HC RX 637 (ALT 250 FOR IP): Performed by: EMERGENCY MEDICINE

## 2024-01-11 PROCEDURE — 83036 HEMOGLOBIN GLYCOSYLATED A1C: CPT

## 2024-01-11 PROCEDURE — 71045 X-RAY EXAM CHEST 1 VIEW: CPT

## 2024-01-11 PROCEDURE — 96361 HYDRATE IV INFUSION ADD-ON: CPT

## 2024-01-11 PROCEDURE — 84443 ASSAY THYROID STIM HORMONE: CPT

## 2024-01-11 PROCEDURE — 99285 EMERGENCY DEPT VISIT HI MDM: CPT

## 2024-01-11 PROCEDURE — 85025 COMPLETE CBC W/AUTO DIFF WBC: CPT

## 2024-01-11 RX ORDER — 0.9 % SODIUM CHLORIDE 0.9 %
1000 INTRAVENOUS SOLUTION INTRAVENOUS ONCE
Status: COMPLETED | OUTPATIENT
Start: 2024-01-11 | End: 2024-01-12

## 2024-01-11 RX ORDER — ACETAMINOPHEN 500 MG
1000 TABLET ORAL ONCE
Status: COMPLETED | OUTPATIENT
Start: 2024-01-11 | End: 2024-01-11

## 2024-01-11 RX ORDER — KETOROLAC TROMETHAMINE 15 MG/ML
15 INJECTION, SOLUTION INTRAMUSCULAR; INTRAVENOUS ONCE
Status: COMPLETED | OUTPATIENT
Start: 2024-01-11 | End: 2024-01-11

## 2024-01-11 RX ADMIN — KETOROLAC TROMETHAMINE 15 MG: 15 INJECTION, SOLUTION INTRAMUSCULAR; INTRAVENOUS at 19:58

## 2024-01-11 RX ADMIN — ACETAMINOPHEN 1000 MG: 500 TABLET ORAL at 19:57

## 2024-01-11 RX ADMIN — SODIUM CHLORIDE 1000 ML: 9 INJECTION, SOLUTION INTRAVENOUS at 19:49

## 2024-01-11 ASSESSMENT — PAIN DESCRIPTION - DESCRIPTORS
DESCRIPTORS: ACHING
DESCRIPTORS: ACHING

## 2024-01-11 ASSESSMENT — PAIN DESCRIPTION - LOCATION
LOCATION: NECK
LOCATION: NECK

## 2024-01-11 ASSESSMENT — PAIN SCALES - GENERAL
PAINLEVEL_OUTOF10: 7
PAINLEVEL_OUTOF10: 7

## 2024-01-12 ENCOUNTER — APPOINTMENT (OUTPATIENT)
Dept: CT IMAGING | Age: 79
DRG: 641 | End: 2024-01-12
Payer: MEDICARE

## 2024-01-12 PROBLEM — R53.1 WEAKNESS: Status: ACTIVE | Noted: 2024-01-12

## 2024-01-12 LAB
BACTERIA URNS QL MICRO: NEGATIVE /HPF
BILIRUB UR QL STRIP: NEGATIVE
CLARITY UR: ABNORMAL
COLOR UR: ABNORMAL
EKG ATRIAL RATE: 102 BPM
EKG P AXIS: 45 DEGREES
EKG P-R INTERVAL: 140 MS
EKG Q-T INTERVAL: 346 MS
EKG QRS DURATION: 80 MS
EKG QTC CALCULATION (BAZETT): 450 MS
EKG R AXIS: 23 DEGREES
EKG T AXIS: 46 DEGREES
EKG VENTRICULAR RATE: 102 BPM
EPI CELLS #/AREA URNS AUTO: ABNORMAL /HPF (ref 0–5)
GLUCOSE BLD-MCNC: 128 MG/DL (ref 70–99)
GLUCOSE BLD-MCNC: 168 MG/DL (ref 70–99)
GLUCOSE UR STRIP-MCNC: NEGATIVE MG/DL
HBA1C MFR BLD: 6.3 % (ref 4.8–5.9)
HGB UR QL STRIP: ABNORMAL
HYALINE CASTS #/AREA URNS LPF: ABNORMAL /LPF (ref 0–5)
INFLUENZA A BY PCR: NEGATIVE
INFLUENZA B BY PCR: NEGATIVE
KETONES UR STRIP-MCNC: ABNORMAL MG/DL
LEUKOCYTE ESTERASE UR QL STRIP: NEGATIVE
NITRITE UR QL STRIP: NEGATIVE
PERFORMED ON: ABNORMAL
PERFORMED ON: ABNORMAL
PH UR STRIP: 5 [PH] (ref 5–9)
PHOSPHATE SERPL-MCNC: 4.7 MG/DL (ref 2.3–4.8)
PROT UR STRIP-MCNC: ABNORMAL MG/DL
RBC #/AREA URNS AUTO: ABNORMAL /HPF (ref 0–5)
SARS-COV-2 RDRP RESP QL NAA+PROBE: NOT DETECTED
SP GR UR STRIP: 1.06 (ref 1–1.03)
TSH REFLEX: 3.13 UIU/ML (ref 0.44–3.86)
URINE REFLEX TO CULTURE: YES
UROBILINOGEN UR STRIP-ACNC: 1 E.U./DL
WBC #/AREA URNS AUTO: ABNORMAL /HPF (ref 0–5)

## 2024-01-12 PROCEDURE — 6360000004 HC RX CONTRAST MEDICATION: Performed by: EMERGENCY MEDICINE

## 2024-01-12 PROCEDURE — 6370000000 HC RX 637 (ALT 250 FOR IP): Performed by: INTERNAL MEDICINE

## 2024-01-12 PROCEDURE — 71275 CT ANGIOGRAPHY CHEST: CPT

## 2024-01-12 PROCEDURE — 97166 OT EVAL MOD COMPLEX 45 MIN: CPT

## 2024-01-12 PROCEDURE — 2580000003 HC RX 258: Performed by: INTERNAL MEDICINE

## 2024-01-12 PROCEDURE — 97162 PT EVAL MOD COMPLEX 30 MIN: CPT

## 2024-01-12 PROCEDURE — 87502 INFLUENZA DNA AMP PROBE: CPT

## 2024-01-12 PROCEDURE — 6360000002 HC RX W HCPCS: Performed by: INTERNAL MEDICINE

## 2024-01-12 PROCEDURE — 87040 BLOOD CULTURE FOR BACTERIA: CPT

## 2024-01-12 PROCEDURE — 87086 URINE CULTURE/COLONY COUNT: CPT

## 2024-01-12 PROCEDURE — 36415 COLL VENOUS BLD VENIPUNCTURE: CPT

## 2024-01-12 PROCEDURE — 87635 SARS-COV-2 COVID-19 AMP PRB: CPT

## 2024-01-12 PROCEDURE — 93010 ELECTROCARDIOGRAM REPORT: CPT | Performed by: INTERNAL MEDICINE

## 2024-01-12 PROCEDURE — 1210000000 HC MED SURG R&B

## 2024-01-12 PROCEDURE — 2580000003 HC RX 258: Performed by: EMERGENCY MEDICINE

## 2024-01-12 RX ORDER — GLUCAGON 1 MG/ML
1 KIT INJECTION PRN
Status: DISCONTINUED | OUTPATIENT
Start: 2024-01-12 | End: 2024-01-17 | Stop reason: HOSPADM

## 2024-01-12 RX ORDER — ACETAMINOPHEN 650 MG/1
650 SUPPOSITORY RECTAL EVERY 6 HOURS PRN
Status: DISCONTINUED | OUTPATIENT
Start: 2024-01-12 | End: 2024-01-17 | Stop reason: HOSPADM

## 2024-01-12 RX ORDER — INSULIN LISPRO 100 [IU]/ML
0-8 INJECTION, SOLUTION INTRAVENOUS; SUBCUTANEOUS
Status: DISCONTINUED | OUTPATIENT
Start: 2024-01-12 | End: 2024-01-17 | Stop reason: HOSPADM

## 2024-01-12 RX ORDER — SODIUM CHLORIDE 0.9 % (FLUSH) 0.9 %
5-40 SYRINGE (ML) INJECTION EVERY 12 HOURS SCHEDULED
Status: DISCONTINUED | OUTPATIENT
Start: 2024-01-12 | End: 2024-01-17 | Stop reason: HOSPADM

## 2024-01-12 RX ORDER — SODIUM CHLORIDE 9 MG/ML
INJECTION, SOLUTION INTRAVENOUS PRN
Status: DISCONTINUED | OUTPATIENT
Start: 2024-01-12 | End: 2024-01-17 | Stop reason: HOSPADM

## 2024-01-12 RX ORDER — POLYETHYLENE GLYCOL 3350 17 G/17G
17 POWDER, FOR SOLUTION ORAL DAILY PRN
Status: DISCONTINUED | OUTPATIENT
Start: 2024-01-12 | End: 2024-01-17 | Stop reason: HOSPADM

## 2024-01-12 RX ORDER — ENOXAPARIN SODIUM 100 MG/ML
30 INJECTION SUBCUTANEOUS DAILY
Status: DISCONTINUED | OUTPATIENT
Start: 2024-01-12 | End: 2024-01-17

## 2024-01-12 RX ORDER — 0.9 % SODIUM CHLORIDE 0.9 %
1000 INTRAVENOUS SOLUTION INTRAVENOUS ONCE
Status: COMPLETED | OUTPATIENT
Start: 2024-01-12 | End: 2024-01-12

## 2024-01-12 RX ORDER — ONDANSETRON 2 MG/ML
4 INJECTION INTRAMUSCULAR; INTRAVENOUS EVERY 6 HOURS PRN
Status: DISCONTINUED | OUTPATIENT
Start: 2024-01-12 | End: 2024-01-17 | Stop reason: HOSPADM

## 2024-01-12 RX ORDER — SODIUM CHLORIDE 0.9 % (FLUSH) 0.9 %
5-40 SYRINGE (ML) INJECTION PRN
Status: DISCONTINUED | OUTPATIENT
Start: 2024-01-12 | End: 2024-01-17 | Stop reason: HOSPADM

## 2024-01-12 RX ORDER — INSULIN LISPRO 100 [IU]/ML
0-4 INJECTION, SOLUTION INTRAVENOUS; SUBCUTANEOUS NIGHTLY
Status: DISCONTINUED | OUTPATIENT
Start: 2024-01-12 | End: 2024-01-17 | Stop reason: HOSPADM

## 2024-01-12 RX ORDER — ONDANSETRON 4 MG/1
4 TABLET, ORALLY DISINTEGRATING ORAL EVERY 8 HOURS PRN
Status: DISCONTINUED | OUTPATIENT
Start: 2024-01-12 | End: 2024-01-17 | Stop reason: HOSPADM

## 2024-01-12 RX ORDER — ACETAMINOPHEN 325 MG/1
650 TABLET ORAL EVERY 6 HOURS PRN
Status: DISCONTINUED | OUTPATIENT
Start: 2024-01-12 | End: 2024-01-17 | Stop reason: HOSPADM

## 2024-01-12 RX ORDER — DEXTROSE MONOHYDRATE 100 MG/ML
INJECTION, SOLUTION INTRAVENOUS CONTINUOUS PRN
Status: DISCONTINUED | OUTPATIENT
Start: 2024-01-12 | End: 2024-01-17 | Stop reason: HOSPADM

## 2024-01-12 RX ADMIN — SODIUM CHLORIDE 1000 ML: 9 INJECTION, SOLUTION INTRAVENOUS at 03:02

## 2024-01-12 RX ADMIN — Medication 10 ML: at 20:56

## 2024-01-12 RX ADMIN — SODIUM CHLORIDE 1000 ML: 9 INJECTION, SOLUTION INTRAVENOUS at 00:20

## 2024-01-12 RX ADMIN — IOPAMIDOL 75 ML: 612 INJECTION, SOLUTION INTRAVENOUS at 01:12

## 2024-01-12 RX ADMIN — ENOXAPARIN SODIUM 30 MG: 100 INJECTION SUBCUTANEOUS at 11:17

## 2024-01-12 RX ADMIN — Medication 10 ML: at 11:17

## 2024-01-12 RX ADMIN — CEFTRIAXONE SODIUM 1000 MG: 1 INJECTION, POWDER, FOR SOLUTION INTRAMUSCULAR; INTRAVENOUS at 08:33

## 2024-01-12 RX ADMIN — ACETAMINOPHEN 650 MG: 325 TABLET ORAL at 22:34

## 2024-01-12 RX ADMIN — ACETAMINOPHEN 650 MG: 325 TABLET ORAL at 17:54

## 2024-01-12 ASSESSMENT — PAIN SCALES - GENERAL
PAINLEVEL_OUTOF10: 4
PAINLEVEL_OUTOF10: 4
PAINLEVEL_OUTOF10: 0

## 2024-01-12 ASSESSMENT — PAIN DESCRIPTION - LOCATION
LOCATION: HEAD
LOCATION: NECK

## 2024-01-12 ASSESSMENT — PAIN DESCRIPTION - DESCRIPTORS: DESCRIPTORS: ACHING;DISCOMFORT

## 2024-01-12 NOTE — ED NOTES
PT presents to ED via EMS with complaints of weakness.   Pt states that she broke her ankle on 12/28 and just had surgery on Monday.LLE is wrapped and immobilized by splint.  Pulses are presents at this time in LE.

## 2024-01-12 NOTE — ED NOTES
Patient to the restroom with one assit and the wheelchair  Patient returned with out any complaints. Patient aware that she has a room at this time  Rocephin started

## 2024-01-12 NOTE — ED NOTES
This nurse attempted to straight cath after another nurse tried. Received a urine return but nothing after that. No urine in bag. Nurse did a bladder scan and only 9cc present

## 2024-01-12 NOTE — ED PROVIDER NOTES
[facility] ED  eMERGENCY dEPARTMENT eNCOUnter      Pt Name: Dhara Wyman  MRN: [id number]  Birthdate 1945  Date of evaluation: 1/11/2024  Provider: Noel Renae MD    CHIEF COMPLAINT       Chief Complaint   Patient presents with    Fatigue         HISTORY OF PRESENT ILLNESS   (Location/Symptom, Timing/Onset,Context/Setting, Quality, Duration, Modifying Factors, Severity)  Note limiting factors.   Dhara Wyman is a 78 y.o. female who presents to the emergency department for general weakness.  Patient is 3 days postop from left ankle surgical repair.  She fractured the ankle 12 days ago but it was surgically repaired 3 days ago as an outpatient and she went home using a wheelchair to get around her home pivoting to use the bathroom etc.  Her grandson lives with her but is not really caregiver just happens to live in the home.  She called EMS tonight because she feels weak and at times lightheaded as if she might pass out.  She has had decreased appetite and really minimal to eat today.  There is concerned she is dehydrated.  Her ankle pain is good she was prescribed Percocet but last took it yesterday.  She is having some neck pain which is consistent with her chronic arthritis.  No related fever or chills.  No chest pain or shortness of breath.  No visible leg swelling above the cast.    HPI    NursingNotes were reviewed.    REVIEW OF SYSTEMS    (2-9 systems for level 4, 10 or more for level 5)     Review of Systems   Constitutional:  Negative for chills and diaphoresis.   HENT:  Negative for congestion, ear pain, mouth sores and sore throat.    Eyes:  Negative for photophobia and discharge.   Respiratory:  Negative for cough, chest tightness, shortness of breath and wheezing.    Cardiovascular:  Negative for chest pain and palpitations.   Gastrointestinal:  Negative for abdominal distention, abdominal pain, diarrhea, nausea and vomiting.   Endocrine: Negative for cold intolerance.

## 2024-01-12 NOTE — H&P
Hospital Medicine  History and Physical    Patient:  Dhara Wyman  MRN: [id number]    CHIEF COMPLAINT:    Chief Complaint   Patient presents with    Fatigue       History Obtained From:  Patient, EMR  Primary Care Physician: Uriel rBuce MD    HISTORY OF PRESENT ILLNESS:   78-year-old female presents with worsening weakness after having ankle surgery 4 days ago.  She has not been eating or drinking much.  She has intermittent abdominal discomfort but states that is usual for her.  No fever, URI symptoms, chest pain, dyspnea, change in bowels or urination.    In the ED, she was found to have soft blood pressure, leukocytosis, new LUISA.    She was functional and mowing the lawn prior to her ankle fracture.    Past Medical History:      Diagnosis Date    Asthma     Breast cancer (HCC)     Hyperlipidemia     Hypertension        Past Surgical History:      Procedure Laterality Date    ANKLE FRACTURE SURGERY Left 1/8/2024    LEFT ANKLE OPEN REDUCTION INTERNAL FIXATION  OF BIMALLEOLAR ANKLE FRACTURE performed by Kenneth Anderson MD at [facility]    BREAST SURGERY      CHOLECYSTECTOMY      COLONOSCOPY      ENDOSCOPY, COLON, DIAGNOSTIC      MASTECTOMY Right     REFRACTIVE SURGERY Bilateral        Medications Prior to Admission:    Prior to Admission medications    Medication Sig Start Date End Date Taking? Authorizing Provider   oxyCODONE (ROXICODONE) 5 MG immediate release tablet Take 1 tablet by mouth every 6 hours as needed for Pain for up to 7 days. Intended supply: 5 days. Take lowest dose possible to manage pain Max Daily Amount: 20 mg 1/8/24 1/15/24  Justin Pitts PA-C   lisinopril (PRINIVIL;ZESTRIL) 30 MG tablet Take 1 tablet by mouth daily    ProviderSavana MD   anastrozole (ARIMIDEX) 1 MG tablet Take 1 tablet by mouth daily    ProviderSavana MD   atorvastatin (LIPITOR) 40 MG tablet Take 1 tablet by mouth daily    ProviderSavana MD   albuterol sulfate HFA (PROVENTIL;VENTOLIN;PROAIR) 108 (90 Base)

## 2024-01-12 NOTE — PLAN OF CARE
See OT evaluation for all goals and OT POC. Electronically signed by Jami Titus OTR/L on 1/12/2024 at 3:28 PM

## 2024-01-12 NOTE — ED NOTES
Pt came from home via EMS for weakness  Pt had an ankle surgery on Monday for a fx  Today the pt began experiencing weakness/lightheadedness and diarrhea x 2 times.   Pt said she felt like she would \"faint\" at home  Squad got a core temp on the pt. Of 94.2 and administered warm fluids via IV.   Recheck of temp is 97.2   Skin is WNL

## 2024-01-12 NOTE — ED NOTES
Pt bladder scanned, 50 mL.  PT straight cathed for urine sample per Dr. Hensley. PT tolerated well, No complications.

## 2024-01-13 LAB
ANION GAP SERPL CALCULATED.3IONS-SCNC: 11 MEQ/L (ref 9–15)
BACTERIA UR CULT: NORMAL
BASOPHILS # BLD: 0.1 K/UL (ref 0–0.2)
BASOPHILS NFR BLD: 0.8 %
BUN SERPL-MCNC: 16 MG/DL (ref 8–23)
CALCIUM SERPL-MCNC: 8.7 MG/DL (ref 8.5–9.9)
CHLORIDE SERPL-SCNC: 108 MEQ/L (ref 95–107)
CO2 SERPL-SCNC: 22 MEQ/L (ref 20–31)
CREAT SERPL-MCNC: 0.7 MG/DL (ref 0.5–0.9)
EOSINOPHIL # BLD: 0.3 K/UL (ref 0–0.7)
EOSINOPHIL NFR BLD: 3.8 %
ERYTHROCYTE [DISTWIDTH] IN BLOOD BY AUTOMATED COUNT: 12.8 % (ref 11.5–14.5)
GLUCOSE BLD-MCNC: 121 MG/DL (ref 70–99)
GLUCOSE BLD-MCNC: 124 MG/DL (ref 70–99)
GLUCOSE BLD-MCNC: 130 MG/DL (ref 70–99)
GLUCOSE BLD-MCNC: 143 MG/DL (ref 70–99)
GLUCOSE SERPL-MCNC: 108 MG/DL (ref 70–99)
HCT VFR BLD AUTO: 30.3 % (ref 37–47)
HGB BLD-MCNC: 9.7 G/DL (ref 12–16)
LYMPHOCYTES # BLD: 1.5 K/UL (ref 1–4.8)
LYMPHOCYTES NFR BLD: 22.3 %
MAGNESIUM SERPL-MCNC: 1.7 MG/DL (ref 1.7–2.4)
MCH RBC QN AUTO: 31.2 PG (ref 27–31.3)
MCHC RBC AUTO-ENTMCNC: 32 % (ref 33–37)
MCV RBC AUTO: 97.4 FL (ref 79.4–94.8)
MONOCYTES # BLD: 0.4 K/UL (ref 0.2–0.8)
MONOCYTES NFR BLD: 5.5 %
NEUTROPHILS # BLD: 4.4 K/UL (ref 1.4–6.5)
NEUTS SEG NFR BLD: 67.1 %
PERFORMED ON: ABNORMAL
PHOSPHATE SERPL-MCNC: 3 MG/DL (ref 2.3–4.8)
PLATELET # BLD AUTO: 252 K/UL (ref 130–400)
POTASSIUM SERPL-SCNC: 3.8 MEQ/L (ref 3.4–4.9)
RBC # BLD AUTO: 3.11 M/UL (ref 4.2–5.4)
SODIUM SERPL-SCNC: 141 MEQ/L (ref 135–144)
WBC # BLD AUTO: 6.5 K/UL (ref 4.8–10.8)

## 2024-01-13 PROCEDURE — 6360000002 HC RX W HCPCS: Performed by: INTERNAL MEDICINE

## 2024-01-13 PROCEDURE — 80048 BASIC METABOLIC PNL TOTAL CA: CPT

## 2024-01-13 PROCEDURE — 85025 COMPLETE CBC W/AUTO DIFF WBC: CPT

## 2024-01-13 PROCEDURE — 2580000003 HC RX 258: Performed by: INTERNAL MEDICINE

## 2024-01-13 PROCEDURE — 36415 COLL VENOUS BLD VENIPUNCTURE: CPT

## 2024-01-13 PROCEDURE — 97116 GAIT TRAINING THERAPY: CPT

## 2024-01-13 PROCEDURE — 6370000000 HC RX 637 (ALT 250 FOR IP): Performed by: INTERNAL MEDICINE

## 2024-01-13 PROCEDURE — 97535 SELF CARE MNGMENT TRAINING: CPT

## 2024-01-13 PROCEDURE — 83735 ASSAY OF MAGNESIUM: CPT

## 2024-01-13 PROCEDURE — 84100 ASSAY OF PHOSPHORUS: CPT

## 2024-01-13 PROCEDURE — 1210000000 HC MED SURG R&B

## 2024-01-13 RX ORDER — KETOROLAC TROMETHAMINE 30 MG/ML
30 INJECTION, SOLUTION INTRAMUSCULAR; INTRAVENOUS EVERY 6 HOURS PRN
Status: DISCONTINUED | OUTPATIENT
Start: 2024-01-13 | End: 2024-01-17 | Stop reason: HOSPADM

## 2024-01-13 RX ADMIN — CEFTRIAXONE SODIUM 1000 MG: 1 INJECTION, POWDER, FOR SOLUTION INTRAMUSCULAR; INTRAVENOUS at 08:50

## 2024-01-13 RX ADMIN — KETOROLAC TROMETHAMINE 30 MG: 30 INJECTION, SOLUTION INTRAMUSCULAR at 18:01

## 2024-01-13 RX ADMIN — ACETAMINOPHEN 650 MG: 325 TABLET ORAL at 22:31

## 2024-01-13 RX ADMIN — ACETAMINOPHEN 650 MG: 325 TABLET ORAL at 16:12

## 2024-01-13 RX ADMIN — ACETAMINOPHEN 650 MG: 325 TABLET ORAL at 06:46

## 2024-01-13 RX ADMIN — Medication 10 ML: at 08:50

## 2024-01-13 RX ADMIN — Medication 10 ML: at 20:31

## 2024-01-13 RX ADMIN — ENOXAPARIN SODIUM 30 MG: 100 INJECTION SUBCUTANEOUS at 08:46

## 2024-01-13 ASSESSMENT — PAIN - FUNCTIONAL ASSESSMENT: PAIN_FUNCTIONAL_ASSESSMENT: ACTIVITIES ARE NOT PREVENTED

## 2024-01-13 ASSESSMENT — PAIN DESCRIPTION - ORIENTATION
ORIENTATION: LEFT

## 2024-01-13 ASSESSMENT — PAIN SCALES - GENERAL
PAINLEVEL_OUTOF10: 6
PAINLEVEL_OUTOF10: 3
PAINLEVEL_OUTOF10: 6
PAINLEVEL_OUTOF10: 3
PAINLEVEL_OUTOF10: 6

## 2024-01-13 ASSESSMENT — PAIN DESCRIPTION - LOCATION
LOCATION: LEG
LOCATION: ANKLE
LOCATION: GENERALIZED;FOOT
LOCATION: FOOT

## 2024-01-13 ASSESSMENT — PAIN DESCRIPTION - PAIN TYPE: TYPE: ACUTE PAIN

## 2024-01-13 ASSESSMENT — PAIN DESCRIPTION - DESCRIPTORS
DESCRIPTORS: ACHING;DISCOMFORT
DESCRIPTORS: ACHING
DESCRIPTORS: ACHING;DISCOMFORT

## 2024-01-13 ASSESSMENT — PAIN DESCRIPTION - FREQUENCY: FREQUENCY: INTERMITTENT

## 2024-01-14 LAB
ANION GAP SERPL CALCULATED.3IONS-SCNC: 10 MEQ/L (ref 9–15)
BUN SERPL-MCNC: 15 MG/DL (ref 8–23)
CALCIUM SERPL-MCNC: 9 MG/DL (ref 8.5–9.9)
CHLORIDE SERPL-SCNC: 106 MEQ/L (ref 95–107)
CO2 SERPL-SCNC: 25 MEQ/L (ref 20–31)
CREAT SERPL-MCNC: 0.67 MG/DL (ref 0.5–0.9)
ERYTHROCYTE [DISTWIDTH] IN BLOOD BY AUTOMATED COUNT: 12.6 % (ref 11.5–14.5)
GLUCOSE BLD-MCNC: 103 MG/DL (ref 70–99)
GLUCOSE BLD-MCNC: 106 MG/DL (ref 70–99)
GLUCOSE BLD-MCNC: 107 MG/DL (ref 70–99)
GLUCOSE BLD-MCNC: 141 MG/DL (ref 70–99)
GLUCOSE SERPL-MCNC: 102 MG/DL (ref 70–99)
HCT VFR BLD AUTO: 30.4 % (ref 37–47)
HGB BLD-MCNC: 9.8 G/DL (ref 12–16)
MCH RBC QN AUTO: 31.1 PG (ref 27–31.3)
MCHC RBC AUTO-ENTMCNC: 32.2 % (ref 33–37)
MCV RBC AUTO: 96.5 FL (ref 79.4–94.8)
PERFORMED ON: ABNORMAL
PLATELET # BLD AUTO: 230 K/UL (ref 130–400)
POTASSIUM SERPL-SCNC: 4 MEQ/L (ref 3.4–4.9)
RBC # BLD AUTO: 3.15 M/UL (ref 4.2–5.4)
SODIUM SERPL-SCNC: 141 MEQ/L (ref 135–144)
WBC # BLD AUTO: 4.6 K/UL (ref 4.8–10.8)

## 2024-01-14 PROCEDURE — 6370000000 HC RX 637 (ALT 250 FOR IP): Performed by: INTERNAL MEDICINE

## 2024-01-14 PROCEDURE — 85027 COMPLETE CBC AUTOMATED: CPT

## 2024-01-14 PROCEDURE — 36415 COLL VENOUS BLD VENIPUNCTURE: CPT

## 2024-01-14 PROCEDURE — 2580000003 HC RX 258: Performed by: INTERNAL MEDICINE

## 2024-01-14 PROCEDURE — 6360000002 HC RX W HCPCS: Performed by: INTERNAL MEDICINE

## 2024-01-14 PROCEDURE — 80048 BASIC METABOLIC PNL TOTAL CA: CPT

## 2024-01-14 PROCEDURE — 1210000000 HC MED SURG R&B

## 2024-01-14 RX ORDER — ALBUTEROL SULFATE 90 UG/1
2 AEROSOL, METERED RESPIRATORY (INHALATION) EVERY 4 HOURS PRN
Status: DISCONTINUED | OUTPATIENT
Start: 2024-01-14 | End: 2024-01-17 | Stop reason: HOSPADM

## 2024-01-14 RX ORDER — OXYCODONE HYDROCHLORIDE 5 MG/1
5 TABLET ORAL EVERY 6 HOURS PRN
Status: DISCONTINUED | OUTPATIENT
Start: 2024-01-14 | End: 2024-01-17 | Stop reason: HOSPADM

## 2024-01-14 RX ORDER — ATORVASTATIN CALCIUM 40 MG/1
40 TABLET, FILM COATED ORAL DAILY
Status: DISCONTINUED | OUTPATIENT
Start: 2024-01-14 | End: 2024-01-17 | Stop reason: HOSPADM

## 2024-01-14 RX ORDER — ANASTROZOLE 1 MG/1
1 TABLET ORAL DAILY
Status: DISCONTINUED | OUTPATIENT
Start: 2024-01-14 | End: 2024-01-17 | Stop reason: HOSPADM

## 2024-01-14 RX ADMIN — ENOXAPARIN SODIUM 30 MG: 100 INJECTION SUBCUTANEOUS at 08:32

## 2024-01-14 RX ADMIN — ATORVASTATIN CALCIUM 40 MG: 40 TABLET, FILM COATED ORAL at 08:32

## 2024-01-14 RX ADMIN — LISINOPRIL 30 MG: 20 TABLET ORAL at 08:32

## 2024-01-14 RX ADMIN — ACETAMINOPHEN 650 MG: 325 TABLET ORAL at 18:10

## 2024-01-14 RX ADMIN — ANASTROZOLE 1 MG: 1 TABLET, COATED ORAL at 09:26

## 2024-01-14 RX ADMIN — Medication 10 ML: at 08:34

## 2024-01-14 RX ADMIN — ACETAMINOPHEN 650 MG: 325 TABLET ORAL at 08:41

## 2024-01-14 RX ADMIN — Medication 10 ML: at 22:19

## 2024-01-14 RX ADMIN — POLYETHYLENE GLYCOL 3350 17 G: 17 POWDER, FOR SOLUTION ORAL at 11:36

## 2024-01-14 ASSESSMENT — PAIN SCALES - GENERAL
PAINLEVEL_OUTOF10: 4
PAINLEVEL_OUTOF10: 4
PAINLEVEL_OUTOF10: 6

## 2024-01-14 ASSESSMENT — PAIN DESCRIPTION - ORIENTATION
ORIENTATION: LEFT
ORIENTATION: LEFT

## 2024-01-14 ASSESSMENT — PAIN DESCRIPTION - DESCRIPTORS
DESCRIPTORS: ACHING
DESCRIPTORS: ACHING;DISCOMFORT

## 2024-01-14 ASSESSMENT — PAIN DESCRIPTION - LOCATION
LOCATION: FOOT
LOCATION: LEG

## 2024-01-14 NOTE — DISCHARGE INSTRUCTIONS
Follow up with primary care physician in the next 7 days or sooner if needed. If you do not have a Primary care physician, please schedule an appointment with one. Please ask prior to discharge about a list of local providers.     Please return to ER or call 911 if you develop any significant signs or symptoms.    I may not have addressed all of your medical illnesses or the abnormal blood work or imaging therefore please ask your PCP to obtain [facility] record to follow up on all of the abnormal labs, imaging and findings that I have and have not addressed during your hospitalization.     Discharging you from the hospital does not mean that your medical care ends here and now. You may still need additional work up, investigation, monitoring, and treatment to be handled from this point on by outside providers including your PCP, Specialists and other healthcare providers.     For medication questions, contact your retail pharmacy and your PCP.    Your medical team at [facility] appreciates the opportunity to work with you to get well!    Real Loya,   11:37 AM

## 2024-01-14 NOTE — DISCHARGE SUMMARY
Hospital Medicine Discharge Summary    Dhara Wyman  :  1945  MRN:  [id number]    Admit date:  2024  Discharge date:  2024    Admitting Physician:  Keith Mathur DO  Primary Care Physician:  Uriel Bruce MD      Discharge Diagnoses:    Generalized weakness in the setting of dehydration  UTI ruled out  LUISA in the setting of dehydration  Hyperglycemia-A1c 6.3.  Recent ankle fracture    Chief Complaint   Patient presents with    Fatigue     Hospital Course:       Patient is an 78-year-old female who presented to hospital with generalized weakness and fatigue.  Initially suspected to have UTI.  Was noted to be dehydrated and had minor renal injury.  She was hydrated with IV fluid and started on antibiotic.  Her urine culture was negative.  Antibiotics were stopped.  Her renal function improved.  She underwent PT/OT evaluation.  Patient was evaluated for skilled nursing facility placement.    Exam on discharge:   BP (!) 135/55   Pulse 65   Temp 98.6 °F (37 °C) (Oral)   Resp 18   Ht 1.676 m (5' 6\")   Wt 77.1 kg (170 lb)   SpO2 95%   BMI 27.44 kg/m²   General appearance: No apparent distress, appears stated age and cooperative.  HEENT: Pupils equal, round, and reactive to light. Conjunctivae/corneas clear.  Neck: Supple, with full range of motion. No jugular venous distention. Trachea midline.  Respiratory:  Normal respiratory effort. Clear to auscultation, bilaterally without Rales/Wheezes/Rhonchi.  Cardiovascular: Regular rate and rhythm with normal S1/S2 without murmurs, rubs or gallops.  Abdomen: Soft, non-tender, non-distended with normal bowel sounds.  Musculoskeletal: No clubbing, cyanosis or edema bilaterally.  Full range of motion without deformity.  Skin: Skin color, texture, turgor normal.  No rashes or lesions.  Neuro: Non Focal. Symetrical motor and tone. Nl Comprehension, Alert,awake and oriented. NL CN. Symetrical tone and reflexes.  Psychiatric: Alert and oriented, thought

## 2024-01-15 LAB
ANION GAP SERPL CALCULATED.3IONS-SCNC: 12 MEQ/L (ref 9–15)
BUN SERPL-MCNC: 12 MG/DL (ref 8–23)
CALCIUM SERPL-MCNC: 9 MG/DL (ref 8.5–9.9)
CHLORIDE SERPL-SCNC: 108 MEQ/L (ref 95–107)
CO2 SERPL-SCNC: 25 MEQ/L (ref 20–31)
CREAT SERPL-MCNC: 0.59 MG/DL (ref 0.5–0.9)
ERYTHROCYTE [DISTWIDTH] IN BLOOD BY AUTOMATED COUNT: 12.5 % (ref 11.5–14.5)
GLUCOSE BLD-MCNC: 103 MG/DL (ref 70–99)
GLUCOSE BLD-MCNC: 112 MG/DL (ref 70–99)
GLUCOSE BLD-MCNC: 113 MG/DL (ref 70–99)
GLUCOSE BLD-MCNC: 133 MG/DL (ref 70–99)
GLUCOSE SERPL-MCNC: 116 MG/DL (ref 70–99)
HCT VFR BLD AUTO: 29.9 % (ref 37–47)
HGB BLD-MCNC: 9.5 G/DL (ref 12–16)
MCH RBC QN AUTO: 30.6 PG (ref 27–31.3)
MCHC RBC AUTO-ENTMCNC: 31.8 % (ref 33–37)
MCV RBC AUTO: 96.5 FL (ref 79.4–94.8)
PERFORMED ON: ABNORMAL
PLATELET # BLD AUTO: 233 K/UL (ref 130–400)
POTASSIUM SERPL-SCNC: 4 MEQ/L (ref 3.4–4.9)
RBC # BLD AUTO: 3.1 M/UL (ref 4.2–5.4)
SODIUM SERPL-SCNC: 145 MEQ/L (ref 135–144)
WBC # BLD AUTO: 4.9 K/UL (ref 4.8–10.8)

## 2024-01-15 PROCEDURE — 6360000002 HC RX W HCPCS: Performed by: INTERNAL MEDICINE

## 2024-01-15 PROCEDURE — 1210000000 HC MED SURG R&B

## 2024-01-15 PROCEDURE — 85027 COMPLETE CBC AUTOMATED: CPT

## 2024-01-15 PROCEDURE — 80048 BASIC METABOLIC PNL TOTAL CA: CPT

## 2024-01-15 PROCEDURE — 6370000000 HC RX 637 (ALT 250 FOR IP): Performed by: INTERNAL MEDICINE

## 2024-01-15 PROCEDURE — 97116 GAIT TRAINING THERAPY: CPT

## 2024-01-15 PROCEDURE — 36415 COLL VENOUS BLD VENIPUNCTURE: CPT

## 2024-01-15 PROCEDURE — 2580000003 HC RX 258: Performed by: INTERNAL MEDICINE

## 2024-01-15 PROCEDURE — 97535 SELF CARE MNGMENT TRAINING: CPT

## 2024-01-15 RX ADMIN — ENOXAPARIN SODIUM 30 MG: 100 INJECTION SUBCUTANEOUS at 10:34

## 2024-01-15 RX ADMIN — KETOROLAC TROMETHAMINE 30 MG: 30 INJECTION, SOLUTION INTRAMUSCULAR at 10:30

## 2024-01-15 RX ADMIN — POLYETHYLENE GLYCOL 3350 17 G: 17 POWDER, FOR SOLUTION ORAL at 14:44

## 2024-01-15 RX ADMIN — ACETAMINOPHEN 650 MG: 325 TABLET ORAL at 00:21

## 2024-01-15 RX ADMIN — ACETAMINOPHEN 650 MG: 325 TABLET ORAL at 20:23

## 2024-01-15 RX ADMIN — ANASTROZOLE 1 MG: 1 TABLET, COATED ORAL at 10:37

## 2024-01-15 RX ADMIN — Medication 10 ML: at 10:35

## 2024-01-15 RX ADMIN — LISINOPRIL 30 MG: 20 TABLET ORAL at 10:35

## 2024-01-15 RX ADMIN — ATORVASTATIN CALCIUM 40 MG: 40 TABLET, FILM COATED ORAL at 10:34

## 2024-01-15 RX ADMIN — Medication 10 ML: at 21:59

## 2024-01-15 ASSESSMENT — PAIN DESCRIPTION - DESCRIPTORS
DESCRIPTORS: BURNING
DESCRIPTORS: BURNING;DISCOMFORT
DESCRIPTORS: ACHING
DESCRIPTORS: ACHING

## 2024-01-15 ASSESSMENT — PAIN SCALES - GENERAL
PAINLEVEL_OUTOF10: 6
PAINLEVEL_OUTOF10: 6
PAINLEVEL_OUTOF10: 4
PAINLEVEL_OUTOF10: 4

## 2024-01-15 ASSESSMENT — PAIN DESCRIPTION - LOCATION
LOCATION: LEG
LOCATION: FOOT
LOCATION: LEG
LOCATION: ANKLE

## 2024-01-15 ASSESSMENT — PAIN DESCRIPTION - ORIENTATION
ORIENTATION: LEFT

## 2024-01-15 NOTE — DISCHARGE INSTR - COC
Continuity of Care Form    Patient Name: Dhara Wyman   :  1945  MRN:  [id number]    Admit date:  2024  Discharge date:  2024    Code Status Order: Full Code   Advance Directives:     Admitting Physician:  Keith Mathur DO  PCP: Uriel Bruce MD    Discharging Nurse: David SIERRA   Discharging Hospital Unit/Room#: W492/W492-01  Discharging Unit Phone Number: [phone]    Emergency Contact:   Extended Emergency Contact Information  Primary Emergency Contact: SIDRA URENA  Mobile Phone: [phone]  Relation: Grandchild    Past Surgical History:  Past Surgical History:   Procedure Laterality Date    ANKLE FRACTURE SURGERY Left 2024    LEFT ANKLE OPEN REDUCTION INTERNAL FIXATION  OF BIMALLEOLAR ANKLE FRACTURE performed by Kenneth Anderson MD at [facility] OR    BREAST SURGERY      CHOLECYSTECTOMY      COLONOSCOPY      ENDOSCOPY, COLON, DIAGNOSTIC      MASTECTOMY Right     REFRACTIVE SURGERY Bilateral        Immunization History:   Immunization History   Administered Date(s) Administered    COVID-19, PFIZER Bivalent, DO NOT Dilute, (age 12y+), IM, 30 mcg/0.3 mL 11/15/2022    COVID-19, PFIZER GRAY top, DO NOT Dilute, (age 12 y+), IM, 30 mcg/0.3 mL 2022    COVID-19, PFIZER PURPLE top, DILUTE for use, (age 12 y+), 30mcg/0.3mL 02/15/2021, 2021, 2021    COVID-19, PFIZER, ( formula), (age 12y+), IM, 30mcg/0.3mL 2023       Active Problems:  Patient Active Problem List   Diagnosis Code    Weakness R53.1       Isolation/Infection:   Isolation            No Isolation          Patient Infection Status       None to display                     Nurse Assessment:  Last Vital Signs: BP (!) 157/66   Pulse 64   Temp 97.7 °F (36.5 °C)   Resp 20   Ht 1.676 m (5' 6\")   Wt 77.1 kg (170 lb)   SpO2 99%   BMI 27.44 kg/m²     Last documented pain score (0-10 scale): Pain Level: 4  Last Weight:   Wt Readings from Last 1 Encounters:   24 77.1 kg (170 lb)     Mental Status:  oriented

## 2024-01-16 LAB
GLUCOSE BLD-MCNC: 101 MG/DL (ref 70–99)
GLUCOSE BLD-MCNC: 112 MG/DL (ref 70–99)
GLUCOSE BLD-MCNC: 114 MG/DL (ref 70–99)
GLUCOSE BLD-MCNC: 120 MG/DL (ref 70–99)
PERFORMED ON: ABNORMAL

## 2024-01-16 PROCEDURE — 6370000000 HC RX 637 (ALT 250 FOR IP): Performed by: INTERNAL MEDICINE

## 2024-01-16 PROCEDURE — 6360000002 HC RX W HCPCS: Performed by: INTERNAL MEDICINE

## 2024-01-16 PROCEDURE — 1210000000 HC MED SURG R&B

## 2024-01-16 PROCEDURE — 2580000003 HC RX 258: Performed by: INTERNAL MEDICINE

## 2024-01-16 PROCEDURE — 97116 GAIT TRAINING THERAPY: CPT

## 2024-01-16 PROCEDURE — 97535 SELF CARE MNGMENT TRAINING: CPT

## 2024-01-16 RX ADMIN — KETOROLAC TROMETHAMINE 30 MG: 30 INJECTION, SOLUTION INTRAMUSCULAR at 02:56

## 2024-01-16 RX ADMIN — LISINOPRIL 30 MG: 20 TABLET ORAL at 08:52

## 2024-01-16 RX ADMIN — KETOROLAC TROMETHAMINE 30 MG: 30 INJECTION, SOLUTION INTRAMUSCULAR at 23:50

## 2024-01-16 RX ADMIN — ENOXAPARIN SODIUM 30 MG: 100 INJECTION SUBCUTANEOUS at 08:52

## 2024-01-16 RX ADMIN — Medication 10 ML: at 09:14

## 2024-01-16 RX ADMIN — ANASTROZOLE 1 MG: 1 TABLET, COATED ORAL at 08:52

## 2024-01-16 RX ADMIN — Medication 10 ML: at 20:58

## 2024-01-16 RX ADMIN — ATORVASTATIN CALCIUM 40 MG: 40 TABLET, FILM COATED ORAL at 08:52

## 2024-01-16 RX ADMIN — ACETAMINOPHEN 650 MG: 325 TABLET ORAL at 15:58

## 2024-01-16 RX ADMIN — ACETAMINOPHEN 650 MG: 325 TABLET ORAL at 08:54

## 2024-01-16 ASSESSMENT — PAIN SCALES - GENERAL
PAINLEVEL_OUTOF10: 3
PAINLEVEL_OUTOF10: 6
PAINLEVEL_OUTOF10: 7
PAINLEVEL_OUTOF10: 3
PAINLEVEL_OUTOF10: 7
PAINLEVEL_OUTOF10: 4

## 2024-01-17 VITALS
WEIGHT: 170 LBS | HEIGHT: 66 IN | BODY MASS INDEX: 27.32 KG/M2 | DIASTOLIC BLOOD PRESSURE: 67 MMHG | OXYGEN SATURATION: 98 % | SYSTOLIC BLOOD PRESSURE: 146 MMHG | RESPIRATION RATE: 18 BRPM | HEART RATE: 64 BPM | TEMPERATURE: 98.4 F

## 2024-01-17 LAB
BACTERIA BLD CULT ORG #2: NORMAL
BACTERIA BLD CULT: NORMAL
GLUCOSE BLD-MCNC: 113 MG/DL (ref 70–99)
GLUCOSE BLD-MCNC: 160 MG/DL (ref 70–99)
PERFORMED ON: ABNORMAL
PERFORMED ON: ABNORMAL

## 2024-01-17 PROCEDURE — 6360000002 HC RX W HCPCS: Performed by: INTERNAL MEDICINE

## 2024-01-17 PROCEDURE — 97542 WHEELCHAIR MNGMENT TRAINING: CPT

## 2024-01-17 PROCEDURE — 2580000003 HC RX 258: Performed by: INTERNAL MEDICINE

## 2024-01-17 PROCEDURE — 97116 GAIT TRAINING THERAPY: CPT

## 2024-01-17 PROCEDURE — 6370000000 HC RX 637 (ALT 250 FOR IP): Performed by: INTERNAL MEDICINE

## 2024-01-17 RX ORDER — ENOXAPARIN SODIUM 100 MG/ML
40 INJECTION SUBCUTANEOUS DAILY
Status: DISCONTINUED | OUTPATIENT
Start: 2024-01-18 | End: 2024-01-17 | Stop reason: HOSPADM

## 2024-01-17 RX ADMIN — LISINOPRIL 30 MG: 20 TABLET ORAL at 08:34

## 2024-01-17 RX ADMIN — KETOROLAC TROMETHAMINE 30 MG: 30 INJECTION, SOLUTION INTRAMUSCULAR at 08:34

## 2024-01-17 RX ADMIN — ATORVASTATIN CALCIUM 40 MG: 40 TABLET, FILM COATED ORAL at 08:34

## 2024-01-17 RX ADMIN — ENOXAPARIN SODIUM 30 MG: 100 INJECTION SUBCUTANEOUS at 08:34

## 2024-01-17 RX ADMIN — Medication 10 ML: at 08:35

## 2024-01-17 RX ADMIN — ANASTROZOLE 1 MG: 1 TABLET, COATED ORAL at 08:34

## 2024-01-17 NOTE — CARE COORDINATION
[facility] was started this morning and is currently pending.   
MET WITH PT TO DISCUSS DC PLAN. PT WOULD LIKE SNF AT [place]. [facility] OFFERED, SNF LIST PROVIDED. PT WILL REVIEW LIST. WILL NEED FOLLOW UP MONDAY FOR CHOICES.   
MET WITH PT UPDATED HER ON SNF DENIAL OFFERED HHC, PT AGREEABLE, [facility] OFFERED WANTS [facility] CALLED REFERRAL TO [facility], HAS BEEN ACCEPTED AT THIS TIME PT TO BE DISCHARGED TODAY BENJAMIN SIERRA UPDATED   
Quality round completed with care management team. LSW met with pt at bedside. Discuss DC plan with pt. LSW explain that Therapy recommended HHC at [place].   Pt would like to go to SNF.   [facility] or [facility].   LSW explain not sure if insurance will approve. Pt still want to continue.   Referral sent to both.     LSW will follow.     Electronically signed by BE Gill on 1/15/2024 at 10:49 AM    [facility] does not have bed.   LSW notify María to start pre-cert for pt.     LSW will follow.     Electronically signed by BE Gill on 1/15/2024 at 5:04 PM    
[phone] Fax: [phone]

## 2024-01-18 NOTE — PROGRESS NOTES
DVT / VTE PROPHYLAXIS EVALUATION    Recent Labs     01/15/24  0344   BUN 12   CREATININE 0.59      HGB 9.5*   HCT 29.9*           Current order:  Enoxaparin 30 mg SUBQ once daily      Height: 167.6 cm (5' 6\"), Weight - Scale: 77.1 kg (170 lb)  Estimated Creatinine Clearance: 80 mL/min (based on SCr of 0.59 mg/dL).    Plan:  Pharmacologic VTE prophylaxis modified based on patient renal function per [facility]/P&T approved protocol     Patient Weight (kg)      50.9 and below .9 101-150.9 151-174.9 175 or greater   Estimated   CrCl  (ml/min) 30 or greater []   30 mg   SUBQ daily   [x]   40 mg   SUBQ daily (or 30 mg BID for orthopedic cases) []  30 mg SUBQ   BID  []  40 mg   SUBQ   BID []  60mg SUBQ BID    15-29.9 []  UFH 5000   units SUBQ BID []  30 mg   SUBQ daily [] 30 mg SUBQ   daily []  40 mg SUBQ   daily [] 60 mg SUBQ   daily    Less than 15 or dialysis []  UFH 5000   units SUBQ BID [] UFH 5000 units SUBQ TID []  UFH 7500   units   SUBQ TID           
Discharge instructions provided to patient.  Verbalized understanding of follow up appointments, activity, wound care, medications and reasons to return to ED/call physician. All questions answered. Copy of discharge instructions provided. Assisted into wheelchair and taken to personal car. Denies further needs.      IV removed without complication. Pt tolerated well. Catheter intact, dressing applied. No drainage noted.          
Internal Medicine   Hospitalist   Progress Note    1/15/2024   1:03 PM    Name:  Dhara Wyman  MRN:    [id number]     IP Day: 3     Admit Date: 2024  7:17 PM  PCP: Uriel Bruce MD    Code Status:  Full Code    Assessment and Plan:        Active Problems/ diagnosis:     1.  Weakness secondary to dehydration, ruled out UTI  -Stopped IV Rocephin.  Her urine and blood cultures are negative.  -s/p IVF  -PT/OT-May need skilled nursing facility.     2.  LUISA due to dehydration-improved.     3.  Hyperglycemia/ pre-DM  -  A1c 6.3. doesn't qualify for outpt meds   -SSI for now     4.  Recent ankle fracture     Lovenox prophylaxis  Full code    7 pm- 7 am, please contact on callHospitalist for any needs     Dispo-DC to SNF once available    Subjective:      no new events. No new symptoms.     Physical Examination:      Vitals:  BP (!) 157/66   Pulse 64   Temp 97.7 °F (36.5 °C)   Resp 20   Ht 1.676 m (5' 6\")   Wt 77.1 kg (170 lb)   SpO2 99%   BMI 27.44 kg/m²   Temp (24hrs), Av.3 °F (36.8 °C), Min:97.7 °F (36.5 °C), Max:99.1 °F (37.3 °C)      General appearance: alert, cooperative and no distress  Mental Status: oriented to person, place and time and normal affect  Lungs: clear to auscultation bilaterally, normal effort  Heart: regular rate and rhythm, no murmur  Abdomen: soft, nontender, nondistended, bowel sounds present, no masses  Extremities: left foot splint. no edema, redness, tenderness in the calves  Skin: no gross lesions, rashes    Data:     Labs:  Recent Labs     24  0748 01/15/24  0344   WBC 4.6* 4.9   HGB 9.8* 9.5*    233     Recent Labs     24  0748 01/15/24  0344    145*   K 4.0 4.0    108*   CO2 25 25   BUN 15 12   CREATININE 0.67 0.59   GLUCOSE 102* 116*     No results for input(s): \"AST\", \"ALT\", \"ALB\", \"BILITOT\", \"ALKPHOS\" in the last 72 hours.      Current Facility-Administered Medications   Medication Dose Route Frequency Provider Last Rate Last Admin    
Internal Medicine   Hospitalist   Progress Note    2024   10:51 AM    Name:  Dhara Wyman  MRN:    [id number]     IP Day: 1     Admit Date: 2024  7:17 PM  PCP: Uriel Bruce MD    Code Status:  Full Code    Assessment and Plan:        Active Problems/ diagnosis:     1.  Weakness secondary to dehydration, possible UTI  -Ceftriaxone.  Follow culture  -s/p IVF  -PT/OT     2.  LUISA due to dehydration     3.  Hyperglycemia/ pre-DM  -  A1c 6.3. doesn't qualify for outpt meds   -SSI for now     4.  Recent ankle fracture     Lovenox prophylaxis  Full code    7 pm- 7 am, please contact on callHospitalist for any needs     Subjective:      no new events. No new symptoms.     Physical Examination:      Vitals:  /62   Pulse 66   Temp 98.4 °F (36.9 °C) (Oral)   Resp 18   Ht 1.676 m (5' 6\")   Wt 77.1 kg (170 lb)   SpO2 93%   BMI 27.44 kg/m²   Temp (24hrs), Av.2 °F (36.8 °C), Min:97.7 °F (36.5 °C), Max:98.4 °F (36.9 °C)      General appearance: alert, cooperative and no distress  Mental Status: oriented to person, place and time and normal affect  Lungs: clear to auscultation bilaterally, normal effort  Heart: regular rate and rhythm, no murmur  Abdomen: soft, nontender, nondistended, bowel sounds present, no masses  Extremities: left foot splint. no edema, redness, tenderness in the calves  Skin: no gross lesions, rashes    Data:     Labs:  Recent Labs     24  0305   WBC 18.4* 6.5   HGB 14.6 9.7*   * 252     Recent Labs     24  0305    141   K 3.9 3.8   CL 97 108*   CO2 23 22   BUN 17 16   CREATININE 1.61* 0.70   GLUCOSE 218* 108*     Recent Labs     24   AST 32   ALT 54*   BILITOT 0.7   ALKPHOS 137*       Current Facility-Administered Medications   Medication Dose Route Frequency Provider Last Rate Last Admin    sodium chloride flush 0.9 % injection 5-40 mL  5-40 mL IntraVENous 2 times per day Keith Mathur DO   10 mL at 24 
Internal Medicine   Hospitalist   Progress Note    2024   11:08 AM    Name:  Dhara Wyman  MRN:    [id number]     IP Day: 5     Admit Date: 2024  7:17 PM  PCP: Uriel Bruce MD    Code Status:  Full Code    Assessment and Plan:        Active Problems/ diagnosis:     1.  Weakness secondary to dehydration, ruled out UTI  -Stopped IV Rocephin.  Her urine and blood cultures are negative.  -s/p IVF  -PT/OT      2.  LUISA due to dehydration-improved.     3.  Hyperglycemia/ pre-DM  -  A1c 6.3. doesn't qualify for outpt meds   -SSI for now     4.  Recent ankle fracture     Lovenox prophylaxis  Full code    7 pm- 7 am, please contact on callHospitalist for any needs     Dispo-DC home with [facility]     Subjective:      no new events. No new symptoms.     Physical Examination:      Vitals:  /65   Pulse 62   Temp 98.1 °F (36.7 °C) (Oral)   Resp 16   Ht 1.676 m (5' 6\")   Wt 77.1 kg (170 lb)   SpO2 95%   BMI 27.44 kg/m²   Temp (24hrs), Av.2 °F (36.8 °C), Min:97.9 °F (36.6 °C), Max:98.6 °F (37 °C)      General appearance: alert, cooperative and no distress  Mental Status: oriented to person, place and time and normal affect  Lungs: clear to auscultation bilaterally, normal effort  Heart: regular rate and rhythm, no murmur  Abdomen: soft, nontender, nondistended, bowel sounds present, no masses  Extremities: left foot splint. no edema, redness, tenderness in the calves  Skin: no gross lesions, rashes    Data:     Labs:  Recent Labs     01/15/24  0344   WBC 4.9   HGB 9.5*        Recent Labs     01/15/24  0344   *   K 4.0   *   CO2 25   BUN 12   CREATININE 0.59   GLUCOSE 116*     No results for input(s): \"AST\", \"ALT\", \"ALB\", \"BILITOT\", \"ALKPHOS\" in the last 72 hours.      Current Facility-Administered Medications   Medication Dose Route Frequency Provider Last Rate Last Admin    [START ON 2024] enoxaparin (LOVENOX) injection 40 mg  40 mg SubCUTAneous Daily Real Loya, DO        
Internal Medicine   Hospitalist   Progress Note    2024   11:36 AM    Name:  Dhara Wyman  MRN:    [id number]     IP Day: 2     Admit Date: 2024  7:17 PM  PCP: Uriel Bruce MD    Code Status:  Full Code    Assessment and Plan:        Active Problems/ diagnosis:     1.  Weakness secondary to dehydration, ruled out UTI  -Will stop IV Rocephin.  Her urine and blood cultures are negative.  -s/p IVF  -PT/OT-May need skilled nursing facility.     2.  LUISA due to dehydration-improved.     3.  Hyperglycemia/ pre-DM  -  A1c 6.3. doesn't qualify for outpt meds   -SSI for now     4.  Recent ankle fracture     Lovenox prophylaxis  Full code    7 pm- 7 am, please contact on callHospitalist for any needs     Dispo-possible discharge home versus SNF tomorrow.    Subjective:      no new events. No new symptoms.     Physical Examination:      Vitals:  BP (!) 154/70   Pulse 62   Temp 98.4 °F (36.9 °C) (Oral)   Resp 18   Ht 1.676 m (5' 6\")   Wt 77.1 kg (170 lb)   SpO2 95%   BMI 27.44 kg/m²   Temp (24hrs), Av °F (36.7 °C), Min:97.5 °F (36.4 °C), Max:98.4 °F (36.9 °C)      General appearance: alert, cooperative and no distress  Mental Status: oriented to person, place and time and normal affect  Lungs: clear to auscultation bilaterally, normal effort  Heart: regular rate and rhythm, no murmur  Abdomen: soft, nontender, nondistended, bowel sounds present, no masses  Extremities: left foot splint. no edema, redness, tenderness in the calves  Skin: no gross lesions, rashes    Data:     Labs:  Recent Labs     24  0305 24  0748   WBC 6.5 4.6*   HGB 9.7* 9.8*    230     Recent Labs     24  0305 24  0748    141   K 3.8 4.0   * 106   CO2 22 25   BUN 16 15   CREATININE 0.70 0.67   GLUCOSE 108* 102*     Recent Labs     24  1930   AST 32   ALT 54*   BILITOT 0.7   ALKPHOS 137*       Current Facility-Administered Medications   Medication Dose Route Frequency Provider Last Rate 
Internal Medicine   Hospitalist   Progress Note    2024   3:05 PM    Name:  Dhara Wyman  MRN:    [id number]     IP Day: 5     Admit Date: 2024  7:17 PM  PCP: Uriel Bruce MD    Code Status:  Full Code    Assessment and Plan:        Active Problems/ diagnosis:     1.  Weakness secondary to dehydration, ruled out UTI  -Stopped IV Rocephin.  Her urine and blood cultures are negative.  -s/p IVF  -PT/OT-patient did better with physical therapy and was discharged home with home care.     2.  LUISA due to dehydration-improved.     3.  Hyperglycemia/ pre-DM  -  A1c 6.3. doesn't qualify for outpt meds   -SSI for now     4.  Recent ankle fracture     Lovenox prophylaxis  Full code    7 pm- 7 am, please contact on callHospitalist for any needs     Dispo-DC to home with home care today    Subjective:      no new events. No new symptoms.     Physical Examination:      Vitals:  BP (!) 146/67   Pulse 64   Temp 98.4 °F (36.9 °C) (Oral)   Resp 18   Ht 1.676 m (5' 6\")   Wt 77.1 kg (170 lb)   SpO2 98%   BMI 27.44 kg/m²   Temp (24hrs), Av.1 °F (36.7 °C), Min:97.9 °F (36.6 °C), Max:98.4 °F (36.9 °C)      General appearance: alert, cooperative and no distress  Mental Status: oriented to person, place and time and normal affect  Lungs: clear to auscultation bilaterally, normal effort  Heart: regular rate and rhythm, no murmur  Abdomen: soft, nontender, nondistended, bowel sounds present, no masses  Extremities: left foot splint. no edema, redness, tenderness in the calves  Skin: no gross lesions, rashes    Data:     Labs:  Recent Labs     01/15/24  0344   WBC 4.9   HGB 9.5*        Recent Labs     01/15/24  0344   *   K 4.0   *   CO2 25   BUN 12   CREATININE 0.59   GLUCOSE 116*     No results for input(s): \"AST\", \"ALT\", \"ALB\", \"BILITOT\", \"ALKPHOS\" in the last 72 hours.      Current Facility-Administered Medications   Medication Dose Route Frequency Provider Last Rate Last Admin    [START ON 
[facility]CC[facility] MED SURG TREATMENT NOTE     Date: 1/15/2024  Patient Name: Dhara Wyman        MRN: [id number]  Account: [id number]   : 1945  (78 y.o.)  Room: [place] [address]    Chart Review:    Restrictions  Restrictions/Precautions  Restrictions/Precautions: Fall Risk, Up as Tolerated, Weight Bearing     Safety:  Safety Devices  Type of Devices: All fall risk precautions in place;Call light within reach;Left in bed    Patient's birthday verified: Yes    Subjective:      Pain at start of treatment: Yes: 4/10    Pain at end of treatment: Yes: 4/10    Location: L lower extremity  Description post operation  Nursing notified: Yes  RN: Radha  Intervention: Other: Pt requested pain medication. Rn notified and will check in with pt when available.    Objective:    ADL Status:  ADL  Grooming: Modified independent ;Setup  Grooming Skilled Clinical Factors: Pt brushed teeth, washed face and used shower cap while seated at edge of bed.  UE Bathing: Modified independent   UE Bathing Skilled Clinical Factors: Pt seated at edge of bed  LE Bathing: Modified independent   LE Bathing Skilled Clinical Factors: Pt seated at edge of bed  UE Dressing: Unable to assess(comment)  UE Dressing Skilled Clinical Factors: Hospital gown  LE Dressing: Modified independent   LE Dressing Skilled Clinical Factors: Hospital sock  Skin Care: Soap and water      Therapy key for assistance levels -   Independent/Mod I = Pt. is able to perform task with no assistance but may require a device   Stand by assistance = Pt. does not perform task at an independent level but does not need physical assistance, requires verbal cues  Minimal, Moderate, Maximal Assistance = Pt. requires physical assistance (25%, 50%, 75% assist from helper) for task but is able to actively participate in task   Dependent = Pt. requires total assistance with task and is not able to actively participate with task completion    Orientation 
Patient assessment and vitals complete and per flowsheets. Patient left ankle dressing clean dry and intact. Tylenol for pain per her request. No needs. Awaiting pre-cert.  
Patient assessment and vitals complete and per flowsheets. Toradol given per emar, pt tolerated well, voiced relief from pain. No other needs, awaiting plan for discharge.   
Patient is A&Ox4 and stated she had pian of 6, Toradol was given. VS, assessment completed and required med's were given per MAR. L leg bandage is in tact. Patient denied any further needs and I continued to monitor. Call light is within reach and bed is in lowest position. Electronically signed by Radha Mercedes RN on 1/15/2024 at 4:13 PM     
Physical Therapy  Facility/Department: [facility] MED SURG [id number]/[id number]  Physical Therapy Discharge      NAME: Dhara Wyman    : 1945 (78 y.o.)  MRN: [id number]    Account: [id number]  Gender: female      Patient has been discharged from acute care hospital. DC patient from current PT program.      Electronically signed by Suki Woody PT on 24 at 9:31 AM EST    
Physical Therapy Med Surg Daily Treatment Note  Facility/Department: [address] MED SURG UNIT  Room: [place] [address]       NAME: Dhara Wyman  : 1945 (78 y.o.)  MRN: [id number]  CODE STATUS: Full Code    Date of Service: 1/15/2024    Patient Diagnosis(es): Dehydration [E86.0]  Weakness [R53.1]  Lightheaded [R42]  General weakness [R53.1]   Chief Complaint   Patient presents with    Fatigue     Patient Active Problem List    Diagnosis Date Noted    Weakness 2024        Past Medical History:   Diagnosis Date    Asthma     Breast cancer (HCC)     Hyperlipidemia     Hypertension      Past Surgical History:   Procedure Laterality Date    ANKLE FRACTURE SURGERY Left 2024    LEFT ANKLE OPEN REDUCTION INTERNAL FIXATION  OF BIMALLEOLAR ANKLE FRACTURE performed by Kenneth Anderson MD at [facility] [place] OR    BREAST SURGERY      CHOLECYSTECTOMY      COLONOSCOPY      ENDOSCOPY, COLON, DIAGNOSTIC      MASTECTOMY Right     REFRACTIVE SURGERY Bilateral        Chart Reviewed: Yes  Family / Caregiver Present: No    Restrictions:  Restrictions/Precautions: Fall Risk;Up as Tolerated;Weight Bearing  Lower Extremity Weight Bearing Restrictions  Left Lower Extremity Weight Bearing: Non Weight Bearing  Partial Weight Bearing Percentage Or Pounds: Per pt report s/p ORIF -. In soft splint.    SUBJECTIVE:   Subjective: \"Im ok today, my foot felt like it was burning last night.\"    Pain   0/10 pre and post    OBJECTIVE:        Bed mobility  Supine to Sit: Modified independent  Sit to Supine: Modified independent    Transfers  Sit to Stand: Modified independent  Stand to Sit: Modified independent  Comment: Good safety observed with proper technique for STS transfers    Ambulation  Surface: Level tile  Device: Rolling Walker  Assistance: Modified Independent  Gait Deviations: Slow Selina  Distance: 10' x 2 with turns  Comments: hop step  pattern, good knowledge and compliance with NWB on LLE. Noted increased fatigue in BUE                   PT 
Physical Therapy Med Surg Daily Treatment Note  Facility/Department: [address] MED SURG UNIT  Room: [place] [address]       NAME: Dhara Wyman  : 1945 (78 y.o.)  MRN: [id number]  CODE STATUS: Full Code    Date of Service: 2024    Patient Diagnosis(es): Dehydration [E86.0]  Weakness [R53.1]  Lightheaded [R42]  General weakness [R53.1]   Chief Complaint   Patient presents with    Fatigue     Patient Active Problem List    Diagnosis Date Noted    Weakness 2024        Past Medical History:   Diagnosis Date    Asthma     Breast cancer (HCC)     Hyperlipidemia     Hypertension      Past Surgical History:   Procedure Laterality Date    ANKLE FRACTURE SURGERY Left 2024    LEFT ANKLE OPEN REDUCTION INTERNAL FIXATION  OF BIMALLEOLAR ANKLE FRACTURE performed by Kenneth Anderson MD at [facility] [place] OR    BREAST SURGERY      CHOLECYSTECTOMY      COLONOSCOPY      ENDOSCOPY, COLON, DIAGNOSTIC      MASTECTOMY Right     REFRACTIVE SURGERY Bilateral        Chart Reviewed: Yes  Family / Caregiver Present: No    Restrictions:  Restrictions/Precautions: Fall Risk;Up as Tolerated;Weight Bearing  Lower Extremity Weight Bearing Restrictions  Left Lower Extremity Weight Bearing: Non Weight Bearing  Partial Weight Bearing Percentage Or Pounds: Per pt report s/p ORIF -. In soft splint.    SUBJECTIVE:   Subjective: \"I'll try\"    Pain  Pain: L ankle pain reported at start and end of session, 2-3/10.    OBJECTIVE:      Bed mobility  Supine to Sit: Modified independent  Sit to Supine: Modified independent  Bed Mobility Comments: no safety concerns    Transfers  Sit to Stand: Modified independent  Stand to Sit: Modified independent  Bed to Chair: Modified independent  Comment: Good safety observed with proper technique for STS transfers, stand step transfer perfromed from bed to     Ambulation  Surface: Level tile  Device: Rolling Walker  Assistance: Modified Independent  Gait Deviations: Slow Selina  Distance: 15', 2 x10'  Comments: hop 
Physical Therapy Med Surg Daily Treatment Note  Facility/Department: [address] MED SURG UNIT  Room: [place] [address]       NAME: Dhara Wyman  : 1945 (78 y.o.)  MRN: [id number]  CODE STATUS: Full Code    Date of Service: 2024    Patient Diagnosis(es): Dehydration [E86.0]  Weakness [R53.1]  Lightheaded [R42]  General weakness [R53.1]   Chief Complaint   Patient presents with    Fatigue     Patient Active Problem List    Diagnosis Date Noted    Weakness 2024        Past Medical History:   Diagnosis Date    Asthma     Breast cancer (HCC)     Hyperlipidemia     Hypertension      Past Surgical History:   Procedure Laterality Date    ANKLE FRACTURE SURGERY Left 2024    LEFT ANKLE OPEN REDUCTION INTERNAL FIXATION  OF BIMALLEOLAR ANKLE FRACTURE performed by Kenneth Anderson MD at [facility] OR    BREAST SURGERY      CHOLECYSTECTOMY      COLONOSCOPY      ENDOSCOPY, COLON, DIAGNOSTIC      MASTECTOMY Right     REFRACTIVE SURGERY Bilateral        Chart Reviewed: Yes  Family / Caregiver Present: No    Restrictions:  Restrictions/Precautions: Fall Risk;Up as Tolerated;Weight Bearing  Lower Extremity Weight Bearing Restrictions  Left Lower Extremity Weight Bearing: Non Weight Bearing  Partial Weight Bearing Percentage Or Pounds: Per pt report s/p ORIF . In soft splint.    SUBJECTIVE:   Subjective: \"I wish I wasn't so old.\"    Pain  Pain: 3/10 pre/post tx L ankle.    OBJECTIVE:        Bed mobility  Supine to Sit: Modified independent  Sit to Supine: Modified independent    Transfers  Sit to Stand: Modified independent  Stand to Sit: Modified independent  Comment: VC for safe hand placement able to use WW and Crutches to complete transfers safely.    Ambulation  Surface: Level tile  Device: Rolling Walker  Assistance: Modified Independent  Gait Deviations: Slow Selina  Distance: 5' x3    Stairs/Curb  Stairs?: Yes  Stairs  # Steps : 4  Stairs Height: 6\"  Rails: Bilateral  Device: No Device  Assistance: Minimal 
Pt shift assessment completed. Pt is alert & oriented x4. Vitals stable. Pt piveting to commode when needed. Non-wt bearing to the left foot. Ankle dressing intact. Pt received tylenol for her pain per her request. Pt was finally able to have a BM which I was told she had not had one in 4 days. She has no other requests at this time. Call light within reach. Care ongoing.     Electronically signed by Desmond Real RN on 1/15/2024 at 9:41 PM    
Shift assessment completed. Vitals stable. Pain tolerable to patient at this time. Pt did not need any insulin coverage tonight. She has no requests at this time. Call light within reach. Care ongoing.     Electronically signed by Desmond Real RN on 1/16/2024 at 10:24 PM    
Spiritual Care Services     Summary of Visit:    Encounter Summary  Encounter Overview/Reason : Initial Encounter  Service Provided For:: Patient and family together  Referral/Consult From:: Rounding  Support System: Family members  Complexity of Encounter: Low  Begin Time: 1600  End Time : 1615  Total Time Calculated: 15 min        Spiritual/Emotional needs  Type: Spiritual Support                      Spiritual Assessment/Intervention/Outcomes:    Assessment: Calm    Intervention: Sustaining Presence/Ministry of presence    Outcome: Receptive      Care Plan:    Plan and Referrals  Plan/Referrals: Continue Support (comment)          Spiritual Care Services   Electronically signed by Chaplain [email or web address] on 1/12/2024 at 4:26 PM.    To reach a  for emotional and spiritual support, place an EPIC consult request.   If a  is needed immediately, dial “0” and ask to page the on-call .   
require any physical supervision or assistance from another person for activity completion. Device may be needed.  Stand by assistance = pt requires verbal cues or instructions from another person, close to but not touching, to perform the activity  Minimal assistance= pt performs 75% or more of the activity; assistance is required to complete the activity  Moderate assistance= pt performs 50% of the activity; assistance is required to complete the activity  Maximal assistance = pt performs 25% of the activity; assistance is required to complete the activity  Dependent = pt requires total physical assistance to accomplish the task   
by assistance = pt requires verbal cues or instructions from another person, close to but not touching, to perform the activity  Minimal assistance= pt performs 75% or more of the activity; assistance is required to complete the activity  Moderate assistance= pt performs 50% of the activity; assistance is required to complete the activity  Maximal assistance = pt performs 25% of the activity; assistance is required to complete the activity  Dependent = pt requires total physical assistance to accomplish the task  
safety with ADL tasks.  Performance deficits / Impairments: Decreased functional mobility , Decreased ADL status, Decreased strength, Decreased endurance, Decreased balance, Decreased high-level IADLs  Prognosis: Good  Discharge Recommendations: Continue to assess pending progress  Decision Making: Medium Complexity  History: Pt's medical history is moderately complex  Exam: Pt has 6 performance deficits  Assistance / Modification: Pt requires supervision    [facility] ([facility] [employer]) Self care Score   How much help is needed for putting on and taking off regular lower body clothing?: A Little  How much help is needed for bathing (which includes washing, rinsing, drying)?: A Little  How much help is needed for toileting (which includes using toilet, bedpan, or urinal)?: A Little  How much help is needed for putting on and taking off regular upper body clothing?: A Little  How much help is needed for taking care of personal grooming?: A Little  How much help for eating meals?: None  [facility] Inpatient Daily Activity Raw Score: 19  AM-PAC Inpatient ADL T-Scale Score : 40.22  ADL Inpatient CMS 0-100% Score: 42.8    Therapy key for assistance levels -   Independent/Mod I = Pt. is able to perform task with no assistance but may require a device   Stand by assistance = Pt. does not perform task at an independent level but does not need physical assistance, requires verbal cues  Minimal, Moderate, Maximal Assistance = Pt. requires physical assistance (25%, 50%, 75% assist from helper) for task but is able to actively participate in task   Dependent = Pt. requires total assistance with task and is not able to actively participate with task completion     Plan:  Occupational Therapy Plan  Times Per Week: 1-2 visits  Therapy Duration:  (Length of acute stay)  Current Treatment Recommendations: Strengthening, Balance training, Functional mobility training, Endurance training, Pain management, Safety education & training,

## 2024-01-30 ENCOUNTER — CLINICAL SUPPORT (OUTPATIENT)
Dept: ORTHOPEDIC SURGERY | Facility: CLINIC | Age: 79
End: 2024-01-30
Payer: COMMERCIAL

## 2024-01-30 ENCOUNTER — OFFICE VISIT (OUTPATIENT)
Dept: ORTHOPEDIC SURGERY | Facility: CLINIC | Age: 79
End: 2024-01-30
Payer: COMMERCIAL

## 2024-01-30 DIAGNOSIS — Z98.890 S/P ORIF (OPEN REDUCTION INTERNAL FIXATION) FRACTURE: Primary | ICD-10-CM

## 2024-01-30 DIAGNOSIS — Z98.890 S/P ORIF (OPEN REDUCTION INTERNAL FIXATION) FRACTURE: ICD-10-CM

## 2024-01-30 DIAGNOSIS — Z87.81 S/P ORIF (OPEN REDUCTION INTERNAL FIXATION) FRACTURE: Primary | ICD-10-CM

## 2024-01-30 DIAGNOSIS — Z87.81 S/P ORIF (OPEN REDUCTION INTERNAL FIXATION) FRACTURE: ICD-10-CM

## 2024-01-30 PROCEDURE — 99024 POSTOP FOLLOW-UP VISIT: CPT | Performed by: ORTHOPAEDIC SURGERY

## 2024-01-30 PROCEDURE — L4361 PNEUMA/VAC WALK BOOT PRE OTS: HCPCS | Performed by: ORTHOPAEDIC SURGERY

## 2024-01-30 PROCEDURE — 73610 X-RAY EXAM OF ANKLE: CPT | Mod: LEFT SIDE | Performed by: ORTHOPAEDIC SURGERY

## 2024-01-30 NOTE — PROGRESS NOTES
History of Present Illness  No chief complaint on file.      Patient returns today for evaluation Status Post Open Reduction and Internal Fixation of left ankle trimalleolar fracture the patient notes improvement in pain.  The patient denies any significant numbness or tingling of calf pain.       Exam  side: left Lower Extremity :  Incision healing nicely, no erythema or drainage, the skin is macerated as she presented with her Aquacel dressings on 3 weeks postop  Intact flexion and extension of digits  Neurovascular exam normal distally  2+ dorsalis pedis pulse and good cap refill     Radiographs  No image results found.       Assessment  Patient status post open reduction and internal fixation of a left ankle trimalleolar fracture     Plan  Staples removed, the lateral incision was reapproximated with Steri-Strips  Immobilization: Fracture boot  Weight bearing: NWB (Non Weight Bearing)  Reviewed rehab /return to activities  Follow up 3 weeks with an x-ray plan to progress to weightbearing at that time  All questions answered

## 2024-02-19 DIAGNOSIS — Z87.81 S/P ORIF (OPEN REDUCTION INTERNAL FIXATION) FRACTURE: Primary | ICD-10-CM

## 2024-02-19 DIAGNOSIS — Z98.890 S/P ORIF (OPEN REDUCTION INTERNAL FIXATION) FRACTURE: Primary | ICD-10-CM

## 2024-02-20 ENCOUNTER — OFFICE VISIT (OUTPATIENT)
Dept: ORTHOPEDIC SURGERY | Facility: CLINIC | Age: 79
End: 2024-02-20
Payer: COMMERCIAL

## 2024-02-20 ENCOUNTER — CLINICAL SUPPORT (OUTPATIENT)
Dept: ORTHOPEDIC SURGERY | Facility: CLINIC | Age: 79
End: 2024-02-20
Payer: COMMERCIAL

## 2024-02-20 DIAGNOSIS — Z98.890 S/P ORIF (OPEN REDUCTION INTERNAL FIXATION) FRACTURE: Primary | ICD-10-CM

## 2024-02-20 DIAGNOSIS — Z87.81 S/P ORIF (OPEN REDUCTION INTERNAL FIXATION) FRACTURE: ICD-10-CM

## 2024-02-20 DIAGNOSIS — Z98.890 S/P ORIF (OPEN REDUCTION INTERNAL FIXATION) FRACTURE: ICD-10-CM

## 2024-02-20 DIAGNOSIS — Z87.81 S/P ORIF (OPEN REDUCTION INTERNAL FIXATION) FRACTURE: Primary | ICD-10-CM

## 2024-02-20 PROCEDURE — 1159F MED LIST DOCD IN RCRD: CPT | Performed by: ORTHOPAEDIC SURGERY

## 2024-02-20 PROCEDURE — 73610 X-RAY EXAM OF ANKLE: CPT | Mod: LEFT SIDE | Performed by: ORTHOPAEDIC SURGERY

## 2024-02-20 PROCEDURE — 1036F TOBACCO NON-USER: CPT | Performed by: ORTHOPAEDIC SURGERY

## 2024-02-20 PROCEDURE — 99024 POSTOP FOLLOW-UP VISIT: CPT | Performed by: ORTHOPAEDIC SURGERY

## 2024-02-20 ASSESSMENT — PAIN - FUNCTIONAL ASSESSMENT: PAIN_FUNCTIONAL_ASSESSMENT: NO/DENIES PAIN

## 2024-02-20 NOTE — PROGRESS NOTES
History of Present Illness  Chief Complaint   Patient presents with    Left Ankle - Post-op     status post open reduction and internal fixation of a left ankle trimalleolar fracture on 01-08-24  Xrays today        Patient returns today for evaluation Status Post Open Reduction and Internal Fixation of left trimalleolar ankle fracture from 1/8/2024 the patient notes improvement in pain.  The patient denies any significant numbness or tingling of calf pain.       Exam  side: left Lower Extremity :  Incision healing nicely, no erythema or drainage, eschar present with 2 Steri-Strips over the lateral incision  Intact flexion and extension of digits  Neurovascular exam normal distally  2+ dorsalis pedis pulse and good cap refill     Radiographs  XR ankle left 3+ views  Interpreted By:  Terell Foreman,   STUDY:  XR ANKLE LEFT 3+ VIEWS;  ;  2/20/2024 11:01 am      INDICATION:  Signs/Symptoms:pain.      ACCESSION NUMBER(S):  [id number]      ORDERING CLINICIAN:  TERELL FOREMAN      FINDINGS:  Left ankle films show the medial and lateral hardware in stable and  satisfactory position. The fractures appear united. The mortise is  aligned.          Signed by: Terell Foreman 2/20/2024 11:10 AM  Dictation workstation:   [id number]       Assessment  Patient status post open reduction and internal fixation of a left trimalleolar ankle fracture     Plan  Immobilization: fracture boot  Weight bearing: WBAT (Weight Bearing as Tolerated)  Physical therapy for gait training and ankle motion  Reviewed rehab /return to activities  Follow up 1 month for recheck and x-rays, sooner if there is any problems  Questions answered

## 2024-02-29 ENCOUNTER — HOSPITAL ENCOUNTER (OUTPATIENT)
Dept: PHYSICAL THERAPY | Age: 79
Setting detail: THERAPIES SERIES
Discharge: HOME | End: 2024-02-29
Payer: MEDICARE

## 2024-02-29 PROCEDURE — 97110 THERAPEUTIC EXERCISES: CPT

## 2024-02-29 PROCEDURE — 97162 PT EVAL MOD COMPLEX 30 MIN: CPT

## 2024-02-29 ASSESSMENT — PAIN SCALES - GENERAL: PAINLEVEL_OUTOF10: 0

## 2024-02-29 ASSESSMENT — PAIN DESCRIPTION - ORIENTATION: ORIENTATION: LEFT

## 2024-02-29 ASSESSMENT — PAIN DESCRIPTION - PAIN TYPE: TYPE: SURGICAL PAIN

## 2024-02-29 ASSESSMENT — PAIN DESCRIPTION - DESCRIPTORS: DESCRIPTORS: ACHING;SORE

## 2024-02-29 ASSESSMENT — PAIN DESCRIPTION - LOCATION: LOCATION: ANKLE

## 2024-02-29 NOTE — PROGRESS NOTES
standard prevention interventions Moderate =  Score of 24-44   [x] Discuss fall prevention strategies   [x] Indicate moderate falls risk on eval  []  Use high risk prevention interventions High = Score of 45 and higher   [] Discuss fall prevention strategies   [] Provide supervision during treatment time      Minutes:  PT Individual Minutes  Time In: 1558  Time Out: 1640  Minutes: 42  Timed Code Treatment Minutes: 10 Minutes  Procedure Minutes: x32 min evaluation     Timed Activity Minutes Units   Ther Ex 10 1   Electronically signed by Hyacinth Rodriguez PT on 2/29/24 at 5:15 PM EST

## 2024-02-29 NOTE — THERAPY EVALUATION
[facility]  PHYSICAL THERAPY PLAN OF CARE                                    [facility] [place] [address]     Ph: [phone]  Fax: [phone]    [] Certification  [] Recertification [x]  Plan of Care  [] Progress Note [] Discharge      Referring Provider: Kenneth Anderson MD    From:  Hyacinth Rodriguez, PT, DPT    Patient: Dhara Wyman (78 y.o. female) : 1945 Date: 2024   Medical Diagnosis: Personal history of (healed) traumatic fracture [Z87.81]    Treatment Diagnosis: L ankle, reduced ROM, reduced strength, decreased stability and balance, reduced stair and gait mechanics    Plan of Care/Certification Expiration Date: 24   Progress Report Period from: 2024  to 2024    Visits to Date: 1 No Show: 0 Cancelled Appts: 0    OBJECTIVE:   Short Term Goals - Time Frame for Short Term Goals: 4 weeks    Goals Current/Discharge status  Status   Short Term Goal 1: Patient will report 0-2/10 pain during standing activities for increased QOL  Pain Screening  Patient Currently in Pain: Denies  Pain Assessment: 0-10  Pain Level: 0  Best Pain Level: 0  Worst Pain Level: 4  Pain Type: Surgical pain  Pain Location: Ankle  Pain Orientation: Left  Pain Descriptors: Aching, Sore New   Short Term Goal 2: Patient will demonstrate normal and reciprocal stair negotiation for ease of mobility within home and reach bedroom.  Stairs  # Steps : 4  Stairs Height: 6\"  Rails: Bilateral  Assistance: Supervision  Comment: Non-reciprocal pattern, leading with R during ascending and leading with L during descending, britney UE HR support New     Long Term Goals - Time Frame for Long Term Goals : 8 weeks  Goals Current/ Discharge status Status   Long Term Goal 1: Patient will demonstrate 5-10 deg improvement in ankle mobility for increased stair and gait negotiation. AROM LLE (degrees)  L Ankle Dorsiflexion (0-20): -4  L Ankle Plantar Flexion (0-45): 30  L Ankle Forefoot Inversion (0-40): 16  L Ankle Forefoot

## 2024-03-05 ENCOUNTER — HOSPITAL ENCOUNTER (OUTPATIENT)
Dept: PHYSICAL THERAPY | Age: 79
Setting detail: THERAPIES SERIES
Discharge: HOME | End: 2024-03-05
Payer: MEDICARE

## 2024-03-05 PROCEDURE — 97110 THERAPEUTIC EXERCISES: CPT

## 2024-03-05 ASSESSMENT — PAIN DESCRIPTION - DESCRIPTORS: DESCRIPTORS: ACHING

## 2024-03-05 ASSESSMENT — PAIN DESCRIPTION - PAIN TYPE: TYPE: SURGICAL PAIN

## 2024-03-05 ASSESSMENT — PAIN DESCRIPTION - ORIENTATION: ORIENTATION: LEFT

## 2024-03-05 ASSESSMENT — PAIN SCALES - GENERAL: PAINLEVEL_OUTOF10: 4

## 2024-03-05 ASSESSMENT — PAIN DESCRIPTION - LOCATION: LOCATION: ANKLE

## 2024-03-05 NOTE — PROGRESS NOTES
[facility]  Outpatient Physical Therapy    Treatment Note        Date: 3/8/2024  Patient: Dhara Wyman  : 1945   Confirmed: Yes  MRN: [id number]  Referring Provider: Kenneth Anderson MD    Medical Diagnosis: Personal history of (healed) traumatic fracture [Z87.81]       Treatment Diagnosis: L ankle, reduced ROM, reduced strength, decreased stability and balance, reduced stair and gait mechanics    Visit Information:  Insurance: Payor: [facility] MEDICARE / Plan: [facility] MEDICARE ADVANTAGE / Product Type: *No Product type* /   PT Visit Information  Onset Date: 24  PT Insurance Information: UHC Medicare  Total # of Visits Approved:  (BMN)  Total # of Visits to Date: 2  Plan of Care/Certification Expiration Date: 24  No Show: 0  Progress Note Due Date: 24  Canceled Appointment: 0  Progress Note Counter:     Subjective Information:  Subjective: Pt states she has been working around the house and its ok but when she sits it hurts.    HEP Compliance:  [x] Good [] Fair [] Poor [] Reports not doing due to:               Pain Screening  Patient Currently in Pain: Yes  Pain Assessment: 0-10  Pain Level: 4  Pain Type: Surgical pain  Pain Location: Ankle  Pain Orientation: Left  Pain Descriptors: Aching    Treatment:  Exercises:  Exercises  Exercise 1: abcs x2 sets  Exercise 2: calf str with strap 5 reps x 20 sec holds  Exercise 3: great toe str 5 reps x 20 sec holds  Exercise 4: seated heel slides 10\" x10  Exercise 5: towel scrunches  x 2'  Exercise 6: marbles x 2'  Exercise 7: gait tasks with boot on F/L stepping  Exercise 10: sink exercises with boot on x 10  Exercise 20: HEP:towel scrunches and heel slides   *Indicates exercise, modality, or manual techniques to be initiated when appropriate    Objective Measures:              Strength: [x] NT  [] MMT completed:     ROM: [x] NT  [] ROM measurements:      LTG 2 Current Status:: 3/5: Initiated gait drills with increased discomfort going towards

## 2024-03-14 ENCOUNTER — HOSPITAL ENCOUNTER (OUTPATIENT)
Dept: PHYSICAL THERAPY | Age: 79
Setting detail: THERAPIES SERIES
Discharge: HOME | End: 2024-03-14
Payer: MEDICARE

## 2024-03-14 PROCEDURE — 97110 THERAPEUTIC EXERCISES: CPT

## 2024-03-14 ASSESSMENT — PAIN DESCRIPTION - DESCRIPTORS: DESCRIPTORS: SORE

## 2024-03-14 ASSESSMENT — PAIN DESCRIPTION - PAIN TYPE: TYPE: SURGICAL PAIN

## 2024-03-14 ASSESSMENT — PAIN DESCRIPTION - LOCATION: LOCATION: ANKLE

## 2024-03-14 ASSESSMENT — PAIN SCALES - GENERAL: PAINLEVEL_OUTOF10: 4

## 2024-03-14 ASSESSMENT — PAIN DESCRIPTION - ORIENTATION: ORIENTATION: LEFT

## 2024-03-14 NOTE — PROGRESS NOTES
Therapy                            Cancellation/No-show Note    Date: 2024  Patient: Dhara Wyman (78 y.o. female)  : 1945  MRN:  [id number]  Referring Physician: Kenneth Anderson MD    Medical Diagnosis: Personal history of (healed) traumatic fracture [Z87.81]      Visit Information:  Insurance: Payor: [facility] MEDICARE / Plan: [facility] MEDICARE ADVANTAGE / Product Type: *No Product type* /   Visits to Date: 2   No Show/Cancelled Appts: 0       For today's appointment patient:  [x]  Cancelled  []  Rescheduled appointment  []  No-show   []  Called pt to remind of next appointment     Reason given by patient:  []  Patient ill  []  Conflicting appointment  []  No transportation    []  Conflict with work  [x]  No reason given  []  Other:      [x] Pt has future appointments scheduled, no follow up needed  [] Pt requests to be on hold.    Reason:   If > 2 weeks please discuss with therapist.  [] Therapist to call pt for follow up     Comments:       Signature: Electronically signed by Hyacinth Rodriguez PT on 3/14/24 at 9:12 AM EDT

## 2024-03-14 NOTE — PROGRESS NOTES
Patient will demonstrate 5-10 deg improvement in ankle mobility for increased stair and gait negotiation. In progress   LTG 2 Patient will ambulate community distances without AD and normalized gait mechanics for ease of mobility and indep outside of home In progress   LTG 3 Patient will improve LEFS to >/=50/80 for improved subjective functional mobility. In progress   LTG 4 Patient will demonstrate >/=10 SLS heel raises to indicate functional strength in L foot. In progress   LTG 5 Patient will be indep with HEP to self-manage symptoms upon d/c. In progress     Plan:  Frequency/Duration:  Plan  Plan Frequency: 2  Plan weeks: 8  Current Treatment Recommendations: Strengthening, ROM, Balance training, Functional mobility training, Gait training, Stair training, Neuromuscular re-education, Manual, Endurance training, Pain management, Home exercise program, Safety education & training, Patient/Caregiver education & training, Equipment evaluation, education, & procurement, Modalities, Group Therapy  Modalities: Heat/Cold, Ultrasound, E-stim - unattended, E-stim - manual, Vasopneumatic Device  Pt to continue current HEP.  See objective section for any therapeutic exercise changes, additions or modifications this date.    Therapy Time:      PT Individual Minutes  Time In: 1621  Time Out: 1700  Minutes: 39  Timed Code Treatment Minutes: 39 Minutes  Procedure Minutes:0  Timed Activity Minutes Units   Ther Ex 39 3     Electronically signed by Katherine Parekh PTA on 3/14/24 at 5:17 PM EDT

## 2024-03-18 DIAGNOSIS — Z98.890 S/P ORIF (OPEN REDUCTION INTERNAL FIXATION) FRACTURE: Primary | ICD-10-CM

## 2024-03-18 DIAGNOSIS — Z87.81 S/P ORIF (OPEN REDUCTION INTERNAL FIXATION) FRACTURE: Primary | ICD-10-CM

## 2024-03-19 ENCOUNTER — CLINICAL SUPPORT (OUTPATIENT)
Dept: ORTHOPEDIC SURGERY | Facility: CLINIC | Age: 79
End: 2024-03-19
Payer: COMMERCIAL

## 2024-03-19 ENCOUNTER — OFFICE VISIT (OUTPATIENT)
Dept: ORTHOPEDIC SURGERY | Facility: CLINIC | Age: 79
End: 2024-03-19
Payer: COMMERCIAL

## 2024-03-19 DIAGNOSIS — Z98.890 S/P ORIF (OPEN REDUCTION INTERNAL FIXATION) FRACTURE: Primary | ICD-10-CM

## 2024-03-19 DIAGNOSIS — S82.852A CLOSED TRIMALLEOLAR FRACTURE OF LEFT ANKLE, INITIAL ENCOUNTER: ICD-10-CM

## 2024-03-19 DIAGNOSIS — Z98.890 S/P ORIF (OPEN REDUCTION INTERNAL FIXATION) FRACTURE: ICD-10-CM

## 2024-03-19 DIAGNOSIS — Z87.81 S/P ORIF (OPEN REDUCTION INTERNAL FIXATION) FRACTURE: ICD-10-CM

## 2024-03-19 DIAGNOSIS — Z87.81 S/P ORIF (OPEN REDUCTION INTERNAL FIXATION) FRACTURE: Primary | ICD-10-CM

## 2024-03-19 PROCEDURE — 1036F TOBACCO NON-USER: CPT | Performed by: ORTHOPAEDIC SURGERY

## 2024-03-19 PROCEDURE — 99024 POSTOP FOLLOW-UP VISIT: CPT | Performed by: ORTHOPAEDIC SURGERY

## 2024-03-19 PROCEDURE — 1125F AMNT PAIN NOTED PAIN PRSNT: CPT | Performed by: ORTHOPAEDIC SURGERY

## 2024-03-19 PROCEDURE — 73610 X-RAY EXAM OF ANKLE: CPT | Mod: LEFT SIDE | Performed by: ORTHOPAEDIC SURGERY

## 2024-03-19 PROCEDURE — L1902 AFO ANKLE GAUNTLET PRE OTS: HCPCS | Performed by: ORTHOPAEDIC SURGERY

## 2024-03-19 PROCEDURE — 1159F MED LIST DOCD IN RCRD: CPT | Performed by: ORTHOPAEDIC SURGERY

## 2024-03-19 ASSESSMENT — PAIN SCALES - GENERAL: PAINLEVEL_OUTOF10: 4

## 2024-03-19 ASSESSMENT — PAIN - FUNCTIONAL ASSESSMENT: PAIN_FUNCTIONAL_ASSESSMENT: 0-10

## 2024-03-19 NOTE — PROGRESS NOTES
History of Present Illness  No chief complaint on file.      Patient returns today for evaluation Status Post Open Reduction and Internal Fixation of trimalleolar left ankle fracture.  The patient notes improvement in pain.  The patient denies any significant numbness or tingling of calf pain.       Exam  side: left Lower Extremity :  Incision healing nicely, no erythema or drainage, distal incisional scabbing present  Intact flexion and extension of digits  Neurovascular exam normal distally  2+ dorsalis pedis pulse and good cap refill     Radiographs  Left ankle films show the hardware in stable and satisfactory position.  The medial and lateral malleolar fractures are united.  The mortise is aligned.       Assessment  Patient status post open reduction and internal fixation of a left ankle trimalleolar fracture     Plan  Immobilization: Lace up ankle brace  Weight bearing: WBAT (Weight Bearing as Tolerated)  Reviewed rehab /return to activities  Follow up 1 month with x-rays  All questions answered

## 2024-03-21 ENCOUNTER — HOSPITAL ENCOUNTER (OUTPATIENT)
Dept: PHYSICAL THERAPY | Age: 79
Setting detail: THERAPIES SERIES
Discharge: HOME | End: 2024-03-21
Payer: MEDICARE

## 2024-03-21 PROCEDURE — 97110 THERAPEUTIC EXERCISES: CPT

## 2024-03-28 ENCOUNTER — HOSPITAL ENCOUNTER (OUTPATIENT)
Dept: PHYSICAL THERAPY | Age: 79
Setting detail: THERAPIES SERIES
Discharge: HOME | End: 2024-03-28
Payer: MEDICARE

## 2024-03-28 NOTE — PROGRESS NOTES
Therapy                            Cancellation/No-show Note    Date: 2024  Patient: Dhara Wyman (78 y.o. female)  : 1945  MRN:  [id number]  Referring Physician: Kenneth Anderson MD    Medical Diagnosis: Personal history of (healed) traumatic fracture [Z87.81]      Visit Information:  Insurance: Payor: [facility] MEDICARE / Plan: [facility] MEDICARE ADVANTAGE / Product Type: *No Product type* /   Visits to Date: 4   No Show/Cancelled Appts: 0       For today's appointment patient:  [x]  Cancelled  []  Rescheduled appointment  []  No-show   []  Called pt to remind of next appointment     Reason given by patient:  []  Patient ill  []  Conflicting appointment  []  No transportation    []  Conflict with work  []  No reason given  [x]  Other: car troubles     [] Pt has future appointments scheduled, no follow up needed  [] Pt requests to be on hold.    Reason:   If > 2 weeks please discuss with therapist.  [x] Therapist to call pt for follow up     Comments:       Signature: Electronically signed by Katherine Parekh PTA on 3/28/24 at 12:53 PM EDT

## 2024-04-04 ENCOUNTER — HOSPITAL ENCOUNTER (OUTPATIENT)
Dept: PHYSICAL THERAPY | Age: 79
Setting detail: THERAPIES SERIES
Discharge: HOME | End: 2024-04-04
Payer: MEDICARE

## 2024-04-04 PROCEDURE — 97110 THERAPEUTIC EXERCISES: CPT

## 2024-04-04 PROCEDURE — 97116 GAIT TRAINING THERAPY: CPT

## 2024-04-04 ASSESSMENT — PAIN DESCRIPTION - PAIN TYPE: TYPE: SURGICAL PAIN

## 2024-04-04 ASSESSMENT — PAIN DESCRIPTION - ORIENTATION: ORIENTATION: LEFT

## 2024-04-04 ASSESSMENT — PAIN SCALES - GENERAL: PAINLEVEL_OUTOF10: 6

## 2024-04-04 ASSESSMENT — PAIN DESCRIPTION - LOCATION: LOCATION: ANKLE

## 2024-04-04 ASSESSMENT — PAIN DESCRIPTION - DESCRIPTORS: DESCRIPTORS: ACHING

## 2024-04-04 NOTE — PROGRESS NOTES
[facility]  PHYSICAL THERAPY PLAN OF CARE                                    [place] [address]     Ph: [phone]  Fax: [phone]    [] Certification  [] Recertification []  Plan of Care  [x] Progress Note [] Discharge      Referring Provider: Kenneth Anderson MD    From:  Hyacinth Rodriguez, PT, DPT    Patient: Dhara Wyman (78 y.o. female) : 1945 Date: 2024   Medical Diagnosis: Personal history of (healed) traumatic fracture [Z87.81]    Treatment Diagnosis: L ankle, reduced ROM, reduced strength, decreased stability and balance, reduced stair and gait mechanics    Plan of Care/Certification Expiration Date: 24   Progress Report Period from: 2024  to 2024    Visits to Date: 5 No Show: 0 Cancelled Appts: 2    OBJECTIVE:   Short Term Goals - Time Frame for Short Term Goals: 4 weeks    Goals Current/Discharge status  Status   Short Term Goal 1: Patient will report 0-2/10 pain during standing activities for increased QOL  STG 1 Current Status:: : reports avg pain of 3/10 - increased pain at end of day. In progress   Short Term Goal 2: Patient will demonstrate normal and reciprocal stair negotiation for ease of mobility within home and reach bedroom.  STG 2 Current Status:: : demonstrating reciprocal negotiation, with UE support on HR Met     Long Term Goals - Time Frame for Long Term Goals : 8 weeks  Goals Current/ Discharge status Status   Long Term Goal 1: Patient will demonstrate 5-10 deg improvement in ankle mobility for increased stair and gait negotiation. LTG 1 Current Status:: : see ROM     AROM LLE (degrees)  L Ankle Dorsiflexion (0-20): 0 deg  L Ankle Plantar Flexion (0-45): 46 deg  L Ankle Forefoot Inversion (0-40): 28 deg  L Ankle Forefoot Eversion (0-20): 10 deg  In progress   Long Term Goal 2: Patient will ambulate community distances without AD and normalized gait mechanics for ease of mobility and indep outside of home LTG 2 Current Status:: 
training, Stair training, Neuromuscular re-education, Manual, Endurance training, Pain management, Home exercise program, Safety education & training, Patient/Caregiver education & training, Equipment evaluation, education, & procurement, Modalities, Group Therapy  Modalities: Heat/Cold, Ultrasound, E-stim - unattended, E-stim - manual, Vasopneumatic Device  Additional Comments: d/c on last scheduled visit  Pt to continue current HEP.  See objective section for any therapeutic exercise changes, additions or modifications this date.    Therapy Time:      PT Individual Minutes  Time In: 1620  Time Out: 1700  Minutes: 40  Timed Code Treatment Minutes: 40 Minutes  Procedure Minutes: 0  Timed Activity Minutes Units   Ther Ex 32 2   gait 8 1     Electronically signed by Hyacinth Rodriguez, PT on 4/4/24 at 5:09 PM EDT

## 2024-04-11 ENCOUNTER — HOSPITAL ENCOUNTER (OUTPATIENT)
Dept: PHYSICAL THERAPY | Age: 79
Setting detail: THERAPIES SERIES
Discharge: HOME | End: 2024-04-11
Payer: MEDICARE

## 2024-04-11 PROCEDURE — 97110 THERAPEUTIC EXERCISES: CPT

## 2024-04-11 ASSESSMENT — PAIN SCALES - GENERAL: PAINLEVEL_OUTOF10: 6

## 2024-04-11 NOTE — PROGRESS NOTES
[facility]  Outpatient Physical Therapy    Treatment Note        Date: 2024  Patient: Dhara Wyman  : 1945   Confirmed: Yes  MRN: [id number]  Referring Provider: Kenneth Anderson MD    Medical Diagnosis: Personal history of (healed) traumatic fracture [Z87.81]       Treatment Diagnosis: L ankle, reduced ROM, reduced strength, decreased stability and balance, reduced stair and gait mechanics    Visit Information:  Insurance: Payor: [facility] MEDICARE / Plan: [facility] MEDICARE ADVANTAGE / Product Type: *No Product type* /   PT Visit Information  Onset Date: 24  PT Insurance Information: [employer]HC Medicare  Total # of Visits Approved:  (BMN)  Total # of Visits to Date: 6  Plan of Care/Certification Expiration Date: 24  No Show: 0  Progress Note Due Date: 24  Canceled Appointment: 2  Progress Note Counter:     Subjective Information:  Subjective: Patient reports discomfort especially in toes, patient reports she feels pain is caused from Lace up Brace.  HEP Compliance:  [x] Good [] Fair [] Poor [] Reports not doing due to:               Pain Screening  Patient Currently in Pain: Yes  Pain Level: 6    Treatment:  Exercises:  Exercises  Exercise 2: Gastroc stretch off step 3/30sec George  Exercise 4: Standing eccentric heel raise x10 on 6'' step  Exercise 5: Foam: static standing, weightshifting, marching with UE support  Exercise 7: Gait: F/Lat/Retro/march and holds 0-1 UE support x2 laps ea  Exercise 8: step-ups F/L/R 6\" step x12 reps, each  Exercise 20: HEP: Continue current + Step ups/downs, gastroc str on step, DF mob str          *Indicates exercise, modality, or manual techniques to be initiated when appropriate              Assessment:   Body Structures, Functions, Activity Limitations Requiring Skilled Therapeutic Intervention: Decreased functional mobility , Decreased ADL status, Decreased ROM, Decreased body mechanics, Decreased strength, Increased pain, Decreased balance  Assessment:

## 2024-04-18 ENCOUNTER — HOSPITAL ENCOUNTER (OUTPATIENT)
Dept: PHYSICAL THERAPY | Age: 79
Setting detail: THERAPIES SERIES
Discharge: HOME | End: 2024-04-18
Payer: MEDICARE

## 2024-04-18 PROCEDURE — 97110 THERAPEUTIC EXERCISES: CPT

## 2024-04-18 NOTE — PROGRESS NOTES
[facility]  PHYSICAL THERAPY PLAN OF CARE                                    [facility] [place] [address]     Ph: [phone]  Fax: [phone]    [] Certification  [] Recertification []  Plan of Care  [x] Progress Note [] Discharge      Referring Provider: Kenneth Anderson MD    From:  Hyacinth Rodriguez, PT, DPT    Patient: Dhara Wyman (78 y.o. female) : 1945 Date: 2024   Medical Diagnosis: Personal history of (healed) traumatic fracture [Z87.81]    Treatment Diagnosis: L ankle, reduced ROM, reduced strength, decreased stability and balance, reduced stair and gait mechanics    Plan of Care/Certification Expiration Date: 24   Progress Report Period from: 2024  to 2024    Visits to Date: 7 No Show: 0 Cancelled Appts: 2    OBJECTIVE:   Short Term Goals - Time Frame for Short Term Goals: 4 weeks    Goals Current/Discharge status  Status   Short Term Goal 1: Patient will report 0-2/10 pain during standing activities for increased QOL  STG 1 Current Status:: : reports minimal pain in ankle, however 6/10 avg pain in toes   Partially met, In progress   Short Term Goal 2: Patient will demonstrate normal and reciprocal stair negotiation for ease of mobility within home and reach bedroom.  STG 2 Current Status:: : demonstrating reciprocal negotiation, with UE support on HR   Met     Long Term Goals - Time Frame for Long Term Goals : 8 weeks  Goals Current/ Discharge status Status   Long Term Goal 1: Patient will demonstrate 5-10 deg improvement in ankle mobility for increased stair and gait negotiation. LTG 1 Current Status::  see ROM     AROM LLE (degrees)  L Ankle Dorsiflexion (0-20): 4 deg  L Ankle Plantar Flexion (0-45): 50 deg  L Ankle Forefoot Inversion (0-40): 36 deg  L Ankle Forefoot Eversion (0-20): 10 deg  Met   Long Term Goal 2: Patient will ambulate community distances without AD and normalized gait mechanics for ease of mobility and indep outside of home LTG 2

## 2024-04-18 NOTE — PROGRESS NOTES
[facility]  Outpatient Physical Therapy   Treatment Note        Date: 2024  Patient: Dhara Wyman  : 1945   Confirmed: Yes  MRN: [id number]  Referring Provider: Kenneth Anderson MD      Medical Diagnosis: Personal history of (healed) traumatic fracture [Z87.81]      Treatment Diagnosis: L ankle, reduced ROM, reduced strength, decreased stability and balance, reduced stair and gait mechanics    Visit Information:  Insurance: Payor: [facility] MEDICARE / Plan: [facility] MEDICARE ADVANTAGE / Product Type: *No Product type* /   PT Visit Information  Onset Date: 24  PT Insurance Information: [employer] Medicare  Total # of Visits Approved:  (BMN)  Total # of Visits to Date: 7  Plan of Care/Certification Expiration Date: 24  No Show: 0  Progress Note Due Date: 24  Canceled Appointment: 2  Progress Note Counter:     Subjective Information:  Subjective: Patient reports she wants to be put on hold until she sees the doctor. her ankle has been feeling ok for the most part, just issues with her toes.  HEP Compliance:  [x] Good [] Fair [] Poor [] Reports not doing due to:    Pain Screening  Patient Currently in Pain: Denies    Treatment:  Exercises:  Exercises  Exercise 1: 4-way ankle GTB x15 reps, each  Exercise 2: Gastroc stretch off step 3/30sec George  Exercise 4: Standing eccentric heel raise x15 on 6'' step ; L SL heel raise on step x8  reps  Exercise 5: black dynadisc: FA- EO/EC ; FT EO/EC ; x30 sec, holds, each (SBA to CGA from therapist)  Exercise 7: tandem walking // bars x 3 laps 0-1 UE support  Exercise 8: 6\" Retro step-ups x15 reps  Exercise 9: SLS DF beanbag toss into bucket, george  Exercise 11: 4-way Baps board x15-20 reps, L3  Exercise 19: Objective measures for PN  Exercise 20: HEP: Continue current    *Indicates exercise, modality, or manual techniques to be initiated when appropriate    Objective Measures:     AROM LLE (degrees)  L Ankle Dorsiflexion (0-20): 4 deg  L Ankle Plantar

## 2024-04-22 NOTE — PROGRESS NOTES
History of Present Illness  Chief Complaint   Patient presents with    Left Ankle - Follow-up     status post open reduction and internal fixation of a left ankle trimalleolar fracture on 01-08-24  Xrays today        Patient returns today for evaluation Status Post Open Reduction and Internal Fixation of  left trimalleolar ankle fracture.  The patient notes improvement in pain.  The patient denies any significant numbness or tingling of calf pain.  She is also complaining of left foot pain around the third metatarsal.  She also states she cannot fit her foot into her shoe with the lace up ankle brace.  She states she bought a size 10 or 11 and men's and was able to fit it in that shoe.     Exam  side: left Lower Extremity :  Incision healing nicely, no erythema or drainage, small scab in the mid incision  Intact flexion and extension of digits  Neurovascular exam normal distally  2+ dorsalis pedis pulse and good cap refill  Tenderness to palpation over the third metatarsal dorsally and plantarly     Radiographs  XR ankle left 3+ views  Interpreted By:  Terell Foreman,   STUDY:  XR ANKLE LEFT 3+ VIEWS; ; 4/23/2024 11:05 am      INDICATION:  Signs/Symptoms:pain.      ACCESSION NUMBER(S):  [id number]      ORDERING CLINICIAN:  TERELL FOREMAN      FINDINGS:  Left ankle films show hardware in the medial and lateral malleolus in  stable and satisfactory position from prior films. The fractures are  united. The mortise is aligned.          Signed by: Terell Foreman 4/23/2024 1:19 PM  Dictation workstation:   [id number]  XR foot left 3+ views  Interpreted By:  Terell Foreman,   STUDY:  XR FOOT LEFT 3+ VIEWS; ; 4/23/2024 11:30 am      INDICATION:  Signs/Symptoms:metatarsal pain.      ACCESSION NUMBER(S):  [id number]      ORDERING CLINICIAN:  TERELL FOREMAN      FINDINGS:  Left foot films are negative for fracture, dislocation or destructive  lesion. There is some degenerative change seen of the 1st MTP joint.  There is hardware seen in  the medial and lateral malleolus.          Signed by: Terell Foreman 4/23/2024 1:18 PM  Dictation workstation:   [id number]       Assessment  Patient status post open reduction and internal fixation of a left trimalleolar ankle fracture  Left foot pain     Plan:  Immobilization: Discontinue ankle brace  Weight bearing: WBAT (Weight Bearing as Tolerated)  I explained to her that her ankle may tend to swell for the next year or so but it should gradually improve.  This is due to the soft tissue injury.  Reviewed rehab /return to activities  Follow up as needed  All questions were answered

## 2024-04-23 ENCOUNTER — CLINICAL SUPPORT (OUTPATIENT)
Dept: ORTHOPEDIC SURGERY | Facility: CLINIC | Age: 79
End: 2024-04-23
Payer: COMMERCIAL

## 2024-04-23 ENCOUNTER — OFFICE VISIT (OUTPATIENT)
Dept: ORTHOPEDIC SURGERY | Facility: CLINIC | Age: 79
End: 2024-04-23
Payer: COMMERCIAL

## 2024-04-23 DIAGNOSIS — Z87.81 S/P ORIF (OPEN REDUCTION INTERNAL FIXATION) FRACTURE: ICD-10-CM

## 2024-04-23 DIAGNOSIS — M79.672 LEFT FOOT PAIN: ICD-10-CM

## 2024-04-23 DIAGNOSIS — Z98.890 S/P ORIF (OPEN REDUCTION INTERNAL FIXATION) FRACTURE: ICD-10-CM

## 2024-04-23 DIAGNOSIS — M79.672 LEFT FOOT PAIN: Primary | ICD-10-CM

## 2024-04-23 DIAGNOSIS — Z98.890 S/P ORIF (OPEN REDUCTION INTERNAL FIXATION) FRACTURE: Primary | ICD-10-CM

## 2024-04-23 DIAGNOSIS — Z87.81 S/P ORIF (OPEN REDUCTION INTERNAL FIXATION) FRACTURE: Primary | ICD-10-CM

## 2024-04-23 PROCEDURE — 99213 OFFICE O/P EST LOW 20 MIN: CPT | Performed by: ORTHOPAEDIC SURGERY

## 2024-04-23 PROCEDURE — 1159F MED LIST DOCD IN RCRD: CPT | Performed by: ORTHOPAEDIC SURGERY

## 2024-04-23 PROCEDURE — 73630 X-RAY EXAM OF FOOT: CPT | Mod: LEFT SIDE | Performed by: ORTHOPAEDIC SURGERY

## 2024-04-23 PROCEDURE — 1125F AMNT PAIN NOTED PAIN PRSNT: CPT | Performed by: ORTHOPAEDIC SURGERY

## 2024-04-23 PROCEDURE — 73610 X-RAY EXAM OF ANKLE: CPT | Mod: LEFT SIDE | Performed by: ORTHOPAEDIC SURGERY

## 2024-04-23 PROCEDURE — 1036F TOBACCO NON-USER: CPT | Performed by: ORTHOPAEDIC SURGERY

## 2024-04-23 ASSESSMENT — PAIN SCALES - GENERAL: PAINLEVEL_OUTOF10: 4

## 2024-04-23 ASSESSMENT — PAIN - FUNCTIONAL ASSESSMENT: PAIN_FUNCTIONAL_ASSESSMENT: 0-10

## (undated) DEVICE — PADDING CAST W4INXL4YD SPUN DACRON POLY POR NON STERILE

## (undated) DEVICE — BIT DRL L110MM DIA3.5MM QUIK CPL W/O STP REUSE

## (undated) DEVICE — PAD,ABDOMINAL,8"X10",ST,LF: Brand: MEDLINE

## (undated) DEVICE — KIT ARMOR C DRP COLLAPSIBLE AND SELF EXP TOP CVR FOR FLUOROSCOPIC

## (undated) DEVICE — 3M™ STERI-DRAPE™ U-DRAPE 1015: Brand: STERI-DRAPE™

## (undated) DEVICE — STAPLER SKIN H3.9MM WIRE DIA0.58MM CRWN 6.9MM 35 STPL FIX

## (undated) DEVICE — MARKER SURG SKIN GENTIAN VLT REG TIP W/ 6IN RUL DYNJSM01

## (undated) DEVICE — GOWN,AURORA,NONREINFORCED,LARGE: Brand: MEDLINE

## (undated) DEVICE — DRESSING HYDROFIBER AQUACEL AG ADVANTAGE 3.5X10 IN

## (undated) DEVICE — DRESSING HYDROFIBER AQUACEL AG ADVANTAGE 3.5X6 IN

## (undated) DEVICE — BIT DRL L110MM DIA2.5MM G QUIK CPL W/O STP REUSE

## (undated) DEVICE — SPONGE GZ W4XL4IN COT 12 PLY TYP VII WVN C FLD DSGN STERILE

## (undated) DEVICE — TOWEL,OR,DSP,ST,BLUE,STD,4/PK,20PK/CS: Brand: MEDLINE

## (undated) DEVICE — DRESSING GZ W1XL8IN COT XRFRM N ADH OVERWRAP CURAD

## (undated) DEVICE — GLOVE ORANGE PI 8   MSG9080

## (undated) DEVICE — GOWN,SIRUS,NONRNF,SETINSLV,2XL,18/CS: Brand: MEDLINE

## (undated) DEVICE — SYRINGE IRRIG 60ML SFT PLIABLE BLB EZ TO GRP 1 HND USE W/

## (undated) DEVICE — SHEET,DRAPE,53X77,STERILE: Brand: MEDLINE

## (undated) DEVICE — K WIRE FIX L150MM DIA1.25MM S STL TRCR PNT
Type: IMPLANTABLE DEVICE | Site: ANKLE | Status: NON-FUNCTIONAL
Removed: 2024-01-08

## (undated) DEVICE — COVER LT HNDL BLU PLAS

## (undated) DEVICE — SCREW BNE L22MM DIA3.5MM CORT S STL ST NONCANNULATED LOK
Type: IMPLANTABLE DEVICE | Site: ANKLE | Status: NON-FUNCTIONAL
Removed: 2024-01-08

## (undated) DEVICE — BANDAGE COBAN 4 IN COMPR W4INXL5YD FOAM COHESIVE QUIK STK SELF ADH SFT

## (undated) DEVICE — SINGLE PORT MANIFOLD: Brand: NEPTUNE 2

## (undated) DEVICE — GLOVE ORANGE PI 8 1/2   MSG9085

## (undated) DEVICE — 3M™ IOBAN™ 2 ANTIMICROBIAL INCISE DRAPE 6650EZ: Brand: IOBAN™ 2

## (undated) DEVICE — BLADE,CARBON-STEEL,15,STRL,DISPOSABLE,TB: Brand: MEDLINE

## (undated) DEVICE — GLOVE ORANGE PI 7 1/2   MSG9075

## (undated) DEVICE — SPONGE,LAP,18"X18",DLX,XR,ST,5/PK,40/PK: Brand: MEDLINE

## (undated) DEVICE — NEPTUNE E-SEP SMOKE EVACUATION PENCIL, COATED, 70MM BLADE, PUSH BUTTON SWITCH: Brand: NEPTUNE E-SEP

## (undated) DEVICE — BANDAGE COMPR W6INXL5YD WHT BGE POLY COT M E WRP WV HK AND

## (undated) DEVICE — PADDING CAST W6INXL4YD POLY POR SPUN DACRON NON STERILE

## (undated) DEVICE — PACK,ARTHROSCOPY II,SIRUS: Brand: MEDLINE

## (undated) DEVICE — ELECTRODE PT RET AD L9FT HI MOIST COND ADH HYDRGEL CORDED

## (undated) DEVICE — COUNTER NDL 40 COUNT HLD 70 FOAM BLK ADH W/ MAG

## (undated) DEVICE — BANDAGE,ELASTIC,ESMARK,STERILE,6"X9',LF: Brand: MEDLINE

## (undated) DEVICE — SPLINT ORTH DBL SIDE 15 FTX4 IN RL PADDED FIBERGLASS LF

## (undated) DEVICE — TUBING, SUCTION, 9/32" X 12', STRAIGHT: Brand: MEDLINE INDUSTRIES, INC.

## (undated) DEVICE — SUTURE VCRL SZ 2-0 L27IN ABSRB UD L26MM CT-2 1/2 CIR J269H

## (undated) DEVICE — APPLICATOR MEDICATED 26 CC SOLUTION HI LT ORNG CHLORAPREP